# Patient Record
Sex: MALE | Race: BLACK OR AFRICAN AMERICAN | Employment: OTHER | URBAN - METROPOLITAN AREA
[De-identification: names, ages, dates, MRNs, and addresses within clinical notes are randomized per-mention and may not be internally consistent; named-entity substitution may affect disease eponyms.]

---

## 2017-03-07 ENCOUNTER — TELEPHONE (OUTPATIENT)
Dept: FAMILY MEDICINE CLINIC | Age: 67
End: 2017-03-07

## 2017-03-09 ENCOUNTER — OFFICE VISIT (OUTPATIENT)
Dept: FAMILY MEDICINE CLINIC | Age: 67
End: 2017-03-09

## 2017-03-09 VITALS
HEART RATE: 69 BPM | RESPIRATION RATE: 16 BRPM | TEMPERATURE: 97.8 F | WEIGHT: 186.6 LBS | DIASTOLIC BLOOD PRESSURE: 79 MMHG | BODY MASS INDEX: 27.64 KG/M2 | HEIGHT: 69 IN | SYSTOLIC BLOOD PRESSURE: 159 MMHG

## 2017-03-09 DIAGNOSIS — M79.604 PAIN IN BOTH LOWER EXTREMITIES: ICD-10-CM

## 2017-03-09 DIAGNOSIS — M79.605 PAIN IN BOTH LOWER EXTREMITIES: ICD-10-CM

## 2017-03-09 DIAGNOSIS — G57.11 MERALGIA PARESTHETICA OF RIGHT SIDE: Primary | ICD-10-CM

## 2017-03-09 DIAGNOSIS — D72.821 MONOCYTOSIS: ICD-10-CM

## 2017-03-09 DIAGNOSIS — M54.40 ACUTE RIGHT-SIDED LOW BACK PAIN WITH SCIATICA, SCIATICA LATERALITY UNSPECIFIED: ICD-10-CM

## 2017-03-09 PROCEDURE — 4040F PNEUMOC VAC/ADMIN/RCVD: CPT | Performed by: FAMILY MEDICINE

## 2017-03-09 PROCEDURE — G8420 CALC BMI NORM PARAMETERS: HCPCS | Performed by: FAMILY MEDICINE

## 2017-03-09 PROCEDURE — 3017F COLORECTAL CA SCREEN DOC REV: CPT | Performed by: FAMILY MEDICINE

## 2017-03-09 PROCEDURE — G8484 FLU IMMUNIZE NO ADMIN: HCPCS | Performed by: FAMILY MEDICINE

## 2017-03-09 PROCEDURE — 96372 THER/PROPH/DIAG INJ SC/IM: CPT | Performed by: FAMILY MEDICINE

## 2017-03-09 PROCEDURE — G8427 DOCREV CUR MEDS BY ELIG CLIN: HCPCS | Performed by: FAMILY MEDICINE

## 2017-03-09 PROCEDURE — 99214 OFFICE O/P EST MOD 30 MIN: CPT | Performed by: FAMILY MEDICINE

## 2017-03-09 PROCEDURE — 1123F ACP DISCUSS/DSCN MKR DOCD: CPT | Performed by: FAMILY MEDICINE

## 2017-03-09 PROCEDURE — 1036F TOBACCO NON-USER: CPT | Performed by: FAMILY MEDICINE

## 2017-03-09 RX ORDER — MELOXICAM 15 MG/1
15 TABLET ORAL DAILY
Qty: 30 TABLET | Refills: 3 | Status: SHIPPED | OUTPATIENT
Start: 2017-03-09 | End: 2018-08-11 | Stop reason: SDUPTHER

## 2017-03-09 RX ORDER — CYCLOBENZAPRINE HCL 10 MG
10 TABLET ORAL 3 TIMES DAILY PRN
COMMUNITY
End: 2017-03-09

## 2017-03-09 RX ORDER — BETAMETHASONE SODIUM PHOSPHATE AND BETAMETHASONE ACETATE 3; 3 MG/ML; MG/ML
6 INJECTION, SUSPENSION INTRA-ARTICULAR; INTRALESIONAL; INTRAMUSCULAR; SOFT TISSUE ONCE
Status: COMPLETED | OUTPATIENT
Start: 2017-03-09 | End: 2017-03-09

## 2017-03-09 RX ORDER — METHYLPREDNISOLONE ACETATE 40 MG/ML
40 INJECTION, SUSPENSION INTRA-ARTICULAR; INTRALESIONAL; INTRAMUSCULAR; SOFT TISSUE ONCE
Status: COMPLETED | OUTPATIENT
Start: 2017-03-09 | End: 2017-03-09

## 2017-03-09 RX ORDER — NAPROXEN 500 MG/1
500 TABLET ORAL 2 TIMES DAILY WITH MEALS
COMMUNITY
End: 2017-03-09

## 2017-03-09 RX ORDER — METAXALONE 800 MG/1
800 TABLET ORAL 3 TIMES DAILY
Qty: 30 TABLET | Refills: 1 | Status: SHIPPED | OUTPATIENT
Start: 2017-03-09 | End: 2017-03-19

## 2017-03-09 RX ADMIN — METHYLPREDNISOLONE ACETATE 40 MG: 40 INJECTION, SUSPENSION INTRA-ARTICULAR; INTRALESIONAL; INTRAMUSCULAR; SOFT TISSUE at 14:01

## 2017-03-09 RX ADMIN — BETAMETHASONE SODIUM PHOSPHATE AND BETAMETHASONE ACETATE 6 MG: 3; 3 INJECTION, SUSPENSION INTRA-ARTICULAR; INTRALESIONAL; INTRAMUSCULAR; SOFT TISSUE at 14:00

## 2017-03-09 ASSESSMENT — ENCOUNTER SYMPTOMS: BACK PAIN: 1

## 2017-05-16 DIAGNOSIS — J01.00 SUBACUTE MAXILLARY SINUSITIS: ICD-10-CM

## 2017-05-16 RX ORDER — PENICILLIN V POTASSIUM 500 MG/1
TABLET ORAL
Qty: 100 TABLET | Refills: 1 | Status: SHIPPED | OUTPATIENT
Start: 2017-05-16 | End: 2018-05-23 | Stop reason: SDUPTHER

## 2017-06-06 ENCOUNTER — TELEPHONE (OUTPATIENT)
Dept: FAMILY MEDICINE CLINIC | Age: 67
End: 2017-06-06

## 2017-06-15 ENCOUNTER — TELEPHONE (OUTPATIENT)
Dept: FAMILY MEDICINE CLINIC | Age: 67
End: 2017-06-15

## 2017-06-16 ENCOUNTER — TELEPHONE (OUTPATIENT)
Dept: FAMILY MEDICINE CLINIC | Age: 67
End: 2017-06-16

## 2017-07-09 DIAGNOSIS — G57.11 MERALGIA PARESTHETICA OF RIGHT SIDE: ICD-10-CM

## 2017-07-10 RX ORDER — HYDROCHLOROTHIAZIDE 25 MG/1
TABLET ORAL
Qty: 30 TABLET | Refills: 6 | Status: SHIPPED | OUTPATIENT
Start: 2017-07-10 | End: 2018-10-21 | Stop reason: SDUPTHER

## 2017-08-31 ENCOUNTER — TELEPHONE (OUTPATIENT)
Dept: FAMILY MEDICINE CLINIC | Age: 67
End: 2017-08-31

## 2017-09-29 ENCOUNTER — OFFICE VISIT (OUTPATIENT)
Dept: FAMILY MEDICINE CLINIC | Age: 67
End: 2017-09-29
Payer: MEDICARE

## 2017-09-29 ENCOUNTER — TELEPHONE (OUTPATIENT)
Dept: FAMILY MEDICINE CLINIC | Age: 67
End: 2017-09-29

## 2017-09-29 VITALS
WEIGHT: 178.6 LBS | RESPIRATION RATE: 12 BRPM | TEMPERATURE: 98 F | HEART RATE: 75 BPM | DIASTOLIC BLOOD PRESSURE: 79 MMHG | HEIGHT: 69 IN | SYSTOLIC BLOOD PRESSURE: 159 MMHG | OXYGEN SATURATION: 97 % | BODY MASS INDEX: 26.45 KG/M2

## 2017-09-29 DIAGNOSIS — E78.1 PURE HYPERGLYCERIDEMIA: ICD-10-CM

## 2017-09-29 DIAGNOSIS — R20.9 DISTURBANCE OF SKIN SENSATION: ICD-10-CM

## 2017-09-29 DIAGNOSIS — K90.89 OTHER SPECIFIED INTESTINAL MALABSORPTION: ICD-10-CM

## 2017-09-29 DIAGNOSIS — E78.5 LIPIDS BLOOD INCREASED: ICD-10-CM

## 2017-09-29 DIAGNOSIS — E86.0 DEHYDRATION: ICD-10-CM

## 2017-09-29 DIAGNOSIS — R21 RASH AND OTHER NONSPECIFIC SKIN ERUPTION: ICD-10-CM

## 2017-09-29 DIAGNOSIS — R05.9 COUGH: ICD-10-CM

## 2017-09-29 DIAGNOSIS — N52.01 ERECTILE DYSFUNCTION DUE TO ARTERIAL INSUFFICIENCY: Primary | ICD-10-CM

## 2017-09-29 DIAGNOSIS — M62.81 MUSCLE WEAKNESS (GENERALIZED): ICD-10-CM

## 2017-09-29 DIAGNOSIS — G57.11 MERALGIA PARESTHETICA OF RIGHT SIDE: ICD-10-CM

## 2017-09-29 DIAGNOSIS — R55 SYNCOPE AND COLLAPSE: ICD-10-CM

## 2017-09-29 DIAGNOSIS — G44.031 INTRACTABLE PAROXYSMAL HEMICRANIA, UNSPECIFIED CHRONICITY PATTERN: ICD-10-CM

## 2017-09-29 DIAGNOSIS — R04.2 HEMOPTYSIS: ICD-10-CM

## 2017-09-29 DIAGNOSIS — R68.2 DRY MOUTH: ICD-10-CM

## 2017-09-29 DIAGNOSIS — L70.8 OTHER ACNE: ICD-10-CM

## 2017-09-29 DIAGNOSIS — D72.821 MONOCYTOSIS: ICD-10-CM

## 2017-09-29 DIAGNOSIS — R94.5 NONSPECIFIC ABNORMAL RESULTS OF LIVER FUNCTION STUDY: ICD-10-CM

## 2017-09-29 DIAGNOSIS — R79.83 HOMOCYSTEINEMIA: ICD-10-CM

## 2017-09-29 DIAGNOSIS — K64.8 INTERNAL HEMORRHOIDS WITH OTHER COMPLICATION: ICD-10-CM

## 2017-09-29 DIAGNOSIS — R76.11 NONSPECIFIC REACTION TO TUBERCULIN SKIN TEST WITHOUT ACTIVE TUBERCULOSIS: ICD-10-CM

## 2017-09-29 DIAGNOSIS — R73.09 LOW GLUCOSE LEVEL: ICD-10-CM

## 2017-09-29 DIAGNOSIS — I10 ESSENTIAL HYPERTENSION: ICD-10-CM

## 2017-09-29 DIAGNOSIS — M54.2 NECK PAIN ON LEFT SIDE: ICD-10-CM

## 2017-09-29 DIAGNOSIS — R07.89 OTHER CHEST PAIN: ICD-10-CM

## 2017-09-29 DIAGNOSIS — R10.11 ABDOMINAL PAIN, RIGHT UPPER QUADRANT: ICD-10-CM

## 2017-09-29 LAB
ALBUMIN SERPL-MCNC: 4.1 G/DL (ref 3.5–5.1)
ALP BLD-CCNC: 65 U/L (ref 38–126)
ALT SERPL-CCNC: 18 U/L (ref 11–66)
ANION GAP SERPL CALCULATED.3IONS-SCNC: 15 MEQ/L (ref 8–16)
AST SERPL-CCNC: 19 U/L (ref 5–40)
AVERAGE GLUCOSE: 105 MG/DL (ref 70–126)
BASOPHILS # BLD: 2.3 %
BASOPHILS ABSOLUTE: 0.2 THOU/MM3 (ref 0–0.1)
BILIRUB SERPL-MCNC: 0.5 MG/DL (ref 0.3–1.2)
BILIRUBIN DIRECT: < 0.2 MG/DL (ref 0–0.3)
BUN BLDV-MCNC: 16 MG/DL (ref 7–22)
CALCIUM SERPL-MCNC: 9.2 MG/DL (ref 8.5–10.5)
CHLORIDE BLD-SCNC: 99 MEQ/L (ref 98–111)
CO2: 25 MEQ/L (ref 23–33)
CREAT SERPL-MCNC: 0.5 MG/DL (ref 0.4–1.2)
EOSINOPHIL # BLD: 3.2 %
EOSINOPHILS ABSOLUTE: 0.2 THOU/MM3 (ref 0–0.4)
GFR SERPL CREATININE-BSD FRML MDRD: > 90 ML/MIN/1.73M2
GLUCOSE BLD-MCNC: 87 MG/DL (ref 70–108)
HBA1C MFR BLD: 5.5 % (ref 4.4–6.4)
HCT VFR BLD CALC: 43.7 % (ref 42–52)
HEMOGLOBIN: 15.3 GM/DL (ref 14–18)
LYMPHOCYTES # BLD: 19.2 %
LYMPHOCYTES ABSOLUTE: 1.4 THOU/MM3 (ref 1–4.8)
MAGNESIUM: 2.3 MG/DL (ref 1.6–2.4)
MCH RBC QN AUTO: 30.6 PG (ref 27–31)
MCHC RBC AUTO-ENTMCNC: 35.1 GM/DL (ref 33–37)
MCV RBC AUTO: 87.2 FL (ref 80–94)
MONOCYTES # BLD: 10.7 %
MONOCYTES ABSOLUTE: 0.8 THOU/MM3 (ref 0.4–1.3)
NUCLEATED RED BLOOD CELLS: 0 /100 WBC
PDW BLD-RTO: 13.5 % (ref 11.5–14.5)
PLATELET # BLD: 112 THOU/MM3 (ref 130–400)
PMV BLD AUTO: 11.6 MCM (ref 7.4–10.4)
POTASSIUM SERPL-SCNC: 3.3 MEQ/L (ref 3.5–5.2)
PTH INTACT: 74.4 PG/ML (ref 15–65)
RBC # BLD: 5.02 MILL/MM3 (ref 4.7–6.1)
RBC # BLD: NORMAL 10*6/UL
RHEUMATOID FACTOR: < 10 IU/ML (ref 0–13)
SEDIMENTATION RATE, ERYTHROCYTE: 16 MM/HR (ref 0–10)
SEG NEUTROPHILS: 64.6 %
SEGMENTED NEUTROPHILS ABSOLUTE COUNT: 4.8 THOU/MM3 (ref 1.8–7.7)
SODIUM BLD-SCNC: 139 MEQ/L (ref 135–145)
T3 TOTAL: 142 NG/DL (ref 72–181)
THYROXINE (T4): 8.3 UG/DL (ref 4.5–12)
TOTAL PROTEIN: 7.2 G/DL (ref 6.1–8)
TSH SERPL DL<=0.05 MIU/L-ACNC: 1.71 UIU/ML (ref 0.4–4.2)
URIC ACID: 6.5 MG/DL (ref 3.7–7)
VITAMIN D 25-HYDROXY: 50 NG/ML (ref 30–100)
WBC # BLD: 7.4 THOU/MM3 (ref 4.8–10.8)

## 2017-09-29 PROCEDURE — 3017F COLORECTAL CA SCREEN DOC REV: CPT | Performed by: FAMILY MEDICINE

## 2017-09-29 PROCEDURE — G8419 CALC BMI OUT NRM PARAM NOF/U: HCPCS | Performed by: FAMILY MEDICINE

## 2017-09-29 PROCEDURE — G8427 DOCREV CUR MEDS BY ELIG CLIN: HCPCS | Performed by: FAMILY MEDICINE

## 2017-09-29 PROCEDURE — 4040F PNEUMOC VAC/ADMIN/RCVD: CPT | Performed by: FAMILY MEDICINE

## 2017-09-29 PROCEDURE — 99214 OFFICE O/P EST MOD 30 MIN: CPT | Performed by: FAMILY MEDICINE

## 2017-09-29 PROCEDURE — 1036F TOBACCO NON-USER: CPT | Performed by: FAMILY MEDICINE

## 2017-09-29 PROCEDURE — 1123F ACP DISCUSS/DSCN MKR DOCD: CPT | Performed by: FAMILY MEDICINE

## 2017-09-29 RX ORDER — SILDENAFIL 100 MG/1
TABLET, FILM COATED ORAL
Qty: 178 TABLET | Refills: 0 | Status: SHIPPED | OUTPATIENT
Start: 2017-09-29 | End: 2019-02-05 | Stop reason: SDUPTHER

## 2017-09-29 RX ORDER — ASPIRIN 325 MG
325 TABLET ORAL DAILY
COMMUNITY
End: 2018-08-29 | Stop reason: ALTCHOICE

## 2017-09-29 ASSESSMENT — PATIENT HEALTH QUESTIONNAIRE - PHQ9
2. FEELING DOWN, DEPRESSED OR HOPELESS: 0
SUM OF ALL RESPONSES TO PHQ9 QUESTIONS 1 & 2: 0
SUM OF ALL RESPONSES TO PHQ QUESTIONS 1-9: 0
1. LITTLE INTEREST OR PLEASURE IN DOING THINGS: 0

## 2017-09-30 LAB
C-REACTIVE PROTEIN, HIGH SENSITIVITY: 15 MG/L
DHEAS (DHEA SULFATE): 142 UG/DL (ref 42–290)
HOMOCYSTEINE, TOTAL: 7 UMOL/L
RPR: NONREACTIVE
TESTOSTERONE FREE: NORMAL
THYROID PEROXIDASE ANTIBODY: 2.2 IU/ML (ref 0–9)

## 2017-10-01 LAB
ANA SCREEN: NORMAL
GLIADIN PEPTIDE IGG, IGA: NORMAL
HIV-2 AB: NEGATIVE

## 2017-10-03 ENCOUNTER — TELEPHONE (OUTPATIENT)
Dept: FAMILY MEDICINE CLINIC | Age: 67
End: 2017-10-03

## 2017-10-03 LAB — DHEA UNCONJUGATED: 1.13 NG/ML (ref 0.63–4.7)

## 2017-12-21 DIAGNOSIS — M54.9 BACKACHE: ICD-10-CM

## 2017-12-21 DIAGNOSIS — M79.602 PAIN IN BOTH UPPER EXTREMITIES: ICD-10-CM

## 2017-12-21 DIAGNOSIS — K63.5 COLON POLYPS: ICD-10-CM

## 2017-12-21 DIAGNOSIS — M62.81 MUSCLE WEAKNESS (GENERALIZED): ICD-10-CM

## 2017-12-21 DIAGNOSIS — G57.11 MERALGIA PARESTHETICA OF RIGHT SIDE: ICD-10-CM

## 2017-12-21 DIAGNOSIS — G56.90: ICD-10-CM

## 2017-12-21 DIAGNOSIS — R79.83 HOMOCYSTEINEMIA: ICD-10-CM

## 2017-12-21 DIAGNOSIS — R20.9 DISTURBANCE OF SKIN SENSATION: ICD-10-CM

## 2017-12-21 DIAGNOSIS — I10 ESSENTIAL HYPERTENSION: ICD-10-CM

## 2017-12-21 DIAGNOSIS — R94.5 NONSPECIFIC ABNORMAL RESULTS OF LIVER FUNCTION STUDY: ICD-10-CM

## 2017-12-21 DIAGNOSIS — E78.89 LIPIDS ABNORMAL: ICD-10-CM

## 2017-12-21 DIAGNOSIS — M79.601 PAIN IN BOTH UPPER EXTREMITIES: ICD-10-CM

## 2017-12-21 DIAGNOSIS — R10.11 ABDOMINAL PAIN, RIGHT UPPER QUADRANT: ICD-10-CM

## 2017-12-21 RX ORDER — SIMVASTATIN 10 MG
TABLET ORAL
Qty: 90 TABLET | Refills: 2 | Status: SHIPPED | OUTPATIENT
Start: 2017-12-21 | End: 2019-03-01

## 2017-12-21 NOTE — TELEPHONE ENCOUNTER
Requested Prescriptions     Pending Prescriptions Disp Refills    simvastatin (ZOCOR) 10 MG tablet [Pharmacy Med Name: SIMVASTATIN 10 MG TABLET] 90 tablet 2     Sig: TAKE 1 TABLET BY MOUTH NIGHTLY.       LV-09/29/17  NV- none

## 2018-05-23 DIAGNOSIS — J01.00 SUBACUTE MAXILLARY SINUSITIS: ICD-10-CM

## 2018-05-23 RX ORDER — PENICILLIN V POTASSIUM 500 MG/1
TABLET ORAL
Qty: 100 TABLET | Refills: 1 | Status: SHIPPED | OUTPATIENT
Start: 2018-05-23 | End: 2019-03-01 | Stop reason: SDUPTHER

## 2018-08-11 DIAGNOSIS — G57.11 MERALGIA PARESTHETICA OF RIGHT SIDE: ICD-10-CM

## 2018-08-11 DIAGNOSIS — M79.605 PAIN IN BOTH LOWER EXTREMITIES: ICD-10-CM

## 2018-08-11 DIAGNOSIS — M79.604 PAIN IN BOTH LOWER EXTREMITIES: ICD-10-CM

## 2018-08-11 DIAGNOSIS — M54.40 ACUTE RIGHT-SIDED LOW BACK PAIN WITH SCIATICA, SCIATICA LATERALITY UNSPECIFIED: ICD-10-CM

## 2018-08-13 RX ORDER — MELOXICAM 15 MG/1
15 TABLET ORAL DAILY
Qty: 30 TABLET | Refills: 2 | Status: SHIPPED | OUTPATIENT
Start: 2018-08-13 | End: 2019-03-01

## 2018-08-13 NOTE — TELEPHONE ENCOUNTER
Last visit- Visit date not found  Next visit- 8/29/18    Requested Prescriptions     Pending Prescriptions Disp Refills    meloxicam (MOBIC) 15 MG tablet [Pharmacy Med Name: MELOXICAM 15 MG TABLET] 30 tablet 2     Sig: TAKE 1 TABLET BY MOUTH DAILY     Please approve or deny

## 2018-08-29 ENCOUNTER — OFFICE VISIT (OUTPATIENT)
Dept: FAMILY MEDICINE CLINIC | Age: 68
End: 2018-08-29
Payer: MEDICARE

## 2018-08-29 ENCOUNTER — HOSPITAL ENCOUNTER (OUTPATIENT)
Age: 68
Discharge: HOME | End: 2018-08-29
Payer: MEDICARE

## 2018-08-29 VITALS
WEIGHT: 183.6 LBS | HEIGHT: 69 IN | DIASTOLIC BLOOD PRESSURE: 86 MMHG | TEMPERATURE: 97.4 F | SYSTOLIC BLOOD PRESSURE: 152 MMHG | BODY MASS INDEX: 27.19 KG/M2 | RESPIRATION RATE: 10 BRPM

## 2018-08-29 DIAGNOSIS — E86.0 DEHYDRATION: ICD-10-CM

## 2018-08-29 DIAGNOSIS — R05.9 COUGH: ICD-10-CM

## 2018-08-29 DIAGNOSIS — I10 ESSENTIAL HYPERTENSION: ICD-10-CM

## 2018-08-29 DIAGNOSIS — D72.821 MONOCYTOSIS: ICD-10-CM

## 2018-08-29 DIAGNOSIS — E78.5 LIPIDS BLOOD INCREASED: ICD-10-CM

## 2018-08-29 DIAGNOSIS — R55 SYNCOPE AND COLLAPSE: ICD-10-CM

## 2018-08-29 DIAGNOSIS — R21 RASH AND OTHER NONSPECIFIC SKIN ERUPTION: ICD-10-CM

## 2018-08-29 DIAGNOSIS — R10.11 ABDOMINAL PAIN, RIGHT UPPER QUADRANT: ICD-10-CM

## 2018-08-29 DIAGNOSIS — E78.1 PURE HYPERGLYCERIDEMIA: ICD-10-CM

## 2018-08-29 DIAGNOSIS — R35.1 NOCTURIA: ICD-10-CM

## 2018-08-29 DIAGNOSIS — K90.89 OTHER INTESTINAL MALABSORPTION: ICD-10-CM

## 2018-08-29 DIAGNOSIS — R20.9 DISTURBANCE OF SKIN SENSATION: ICD-10-CM

## 2018-08-29 DIAGNOSIS — R76.11 NONSPECIFIC REACTION TO TUBERCULIN SKIN TEST WITHOUT ACTIVE TUBERCULOSIS: ICD-10-CM

## 2018-08-29 DIAGNOSIS — K90.89 OTHER INTESTINAL MALABSORPTION: Primary | ICD-10-CM

## 2018-08-29 DIAGNOSIS — E78.5 ELEVATED LIPIDS: ICD-10-CM

## 2018-08-29 DIAGNOSIS — R73.9 BLOOD GLUCOSE ELEVATED: ICD-10-CM

## 2018-08-29 DIAGNOSIS — M62.81 MUSCLE WEAKNESS (GENERALIZED): ICD-10-CM

## 2018-08-29 DIAGNOSIS — G57.11 MERALGIA PARESTHETICA OF RIGHT SIDE: ICD-10-CM

## 2018-08-29 DIAGNOSIS — R94.5 NONSPECIFIC ABNORMAL RESULTS OF LIVER FUNCTION STUDY: ICD-10-CM

## 2018-08-29 DIAGNOSIS — R07.89 OTHER CHEST PAIN: ICD-10-CM

## 2018-08-29 DIAGNOSIS — R79.83 HOMOCYSTEINEMIA: ICD-10-CM

## 2018-08-29 DIAGNOSIS — R04.2 HEMOPTYSIS: ICD-10-CM

## 2018-08-29 DIAGNOSIS — M54.2 NECK PAIN ON LEFT SIDE: ICD-10-CM

## 2018-08-29 DIAGNOSIS — R68.2 DRY MOUTH: ICD-10-CM

## 2018-08-29 DIAGNOSIS — L70.8 OTHER ACNE: ICD-10-CM

## 2018-08-29 LAB
ALBUMIN SERPL-MCNC: 4.2 G/DL (ref 3.5–5.1)
ALP BLD-CCNC: 80 U/L (ref 38–126)
ALT SERPL-CCNC: 16 U/L (ref 11–66)
AMYLASE: 55 U/L (ref 20–104)
ANION GAP SERPL CALCULATED.3IONS-SCNC: 13 MEQ/L (ref 8–16)
AST SERPL-CCNC: 20 U/L (ref 5–40)
AVERAGE GLUCOSE: 111 MG/DL (ref 70–126)
BASOPHILS # BLD: 0.2 %
BASOPHILS ABSOLUTE: 0 THOU/MM3 (ref 0–0.1)
BILIRUB SERPL-MCNC: 0.4 MG/DL (ref 0.3–1.2)
BILIRUBIN DIRECT: < 0.2 MG/DL (ref 0–0.3)
BILIRUBIN URINE: NEGATIVE
BLOOD, URINE: NEGATIVE
BUN BLDV-MCNC: 14 MG/DL (ref 7–22)
CALCIUM SERPL-MCNC: 9.7 MG/DL (ref 8.5–10.5)
CHARACTER, URINE: CLEAR
CHLORIDE BLD-SCNC: 98 MEQ/L (ref 98–111)
CHOLESTEROL, TOTAL: 213 MG/DL (ref 100–199)
CO2: 25 MEQ/L (ref 23–33)
COLOR: YELLOW
CREAT SERPL-MCNC: 0.6 MG/DL (ref 0.4–1.2)
EOSINOPHIL # BLD: 3.3 %
EOSINOPHILS ABSOLUTE: 0.3 THOU/MM3 (ref 0–0.4)
ERYTHROCYTE [DISTWIDTH] IN BLOOD BY AUTOMATED COUNT: 13.2 % (ref 11.5–14.5)
ERYTHROCYTE [DISTWIDTH] IN BLOOD BY AUTOMATED COUNT: 40.7 FL (ref 35–45)
GFR SERPL CREATININE-BSD FRML MDRD: > 90 ML/MIN/1.73M2
GLUCOSE BLD-MCNC: 94 MG/DL (ref 70–108)
GLUCOSE URINE: NEGATIVE MG/DL
HBA1C MFR BLD: 5.7 % (ref 4.4–6.4)
HCT VFR BLD CALC: 42.7 % (ref 42–52)
HDLC SERPL-MCNC: 34 MG/DL
HEMOGLOBIN: 15.1 GM/DL (ref 14–18)
IMMATURE GRANS (ABS): 0.01 THOU/MM3 (ref 0–0.07)
IMMATURE GRANULOCYTES: 0.1 %
KETONES, URINE: NEGATIVE
LDL CHOLESTEROL CALCULATED: 151 MG/DL
LEUKOCYTE ESTERASE, URINE: NEGATIVE
LIPASE: 25 U/L (ref 5.6–51.3)
LYMPHOCYTES # BLD: 25.2 %
LYMPHOCYTES ABSOLUTE: 2.1 THOU/MM3 (ref 1–4.8)
MAGNESIUM: 2 MG/DL (ref 1.6–2.4)
MCH RBC QN AUTO: 30.2 PG (ref 26–33)
MCHC RBC AUTO-ENTMCNC: 35.4 GM/DL (ref 32.2–35.5)
MCV RBC AUTO: 85.4 FL (ref 80–94)
MONOCYTES # BLD: 11.2 %
MONOCYTES ABSOLUTE: 0.9 THOU/MM3 (ref 0.4–1.3)
NITRITE, URINE: NEGATIVE
NUCLEATED RED BLOOD CELLS: 0 /100 WBC
PH UA: 6.5
PLATELET # BLD: 145 THOU/MM3 (ref 130–400)
PMV BLD AUTO: 12.9 FL (ref 9.4–12.4)
POTASSIUM SERPL-SCNC: 3.5 MEQ/L (ref 3.5–5.2)
PROSTATE SPECIFIC ANTIGEN: 2.06 NG/ML (ref 0–1)
PROTEIN UA: NEGATIVE
PTH INTACT: 75.1 PG/ML (ref 15–65)
RBC # BLD: 5 MILL/MM3 (ref 4.7–6.1)
RHEUMATOID FACTOR: < 10 IU/ML (ref 0–13)
SEDIMENTATION RATE, ERYTHROCYTE: 22 MM/HR (ref 0–10)
SEG NEUTROPHILS: 60 %
SEGMENTED NEUTROPHILS ABSOLUTE COUNT: 5 THOU/MM3 (ref 1.8–7.7)
SODIUM BLD-SCNC: 136 MEQ/L (ref 135–145)
SPECIFIC GRAVITY, URINE: 1.02 (ref 1–1.03)
T3 TOTAL: 123 NG/DL (ref 72–181)
TOTAL PROTEIN: 7.6 G/DL (ref 6.1–8)
TRIGL SERPL-MCNC: 141 MG/DL (ref 0–199)
TSH SERPL DL<=0.05 MIU/L-ACNC: 1.49 UIU/ML (ref 0.4–4.2)
UROBILINOGEN, URINE: 1 EU/DL
VITAMIN D 25-HYDROXY: 37 NG/ML (ref 30–100)
WBC # BLD: 8.4 THOU/MM3 (ref 4.8–10.8)

## 2018-08-29 PROCEDURE — 86430 RHEUMATOID FACTOR TEST QUAL: CPT

## 2018-08-29 PROCEDURE — 1101F PT FALLS ASSESS-DOCD LE1/YR: CPT | Performed by: FAMILY MEDICINE

## 2018-08-29 PROCEDURE — 1123F ACP DISCUSS/DSCN MKR DOCD: CPT | Performed by: FAMILY MEDICINE

## 2018-08-29 PROCEDURE — 83735 ASSAY OF MAGNESIUM: CPT

## 2018-08-29 PROCEDURE — 1036F TOBACCO NON-USER: CPT | Performed by: FAMILY MEDICINE

## 2018-08-29 PROCEDURE — 82150 ASSAY OF AMYLASE: CPT

## 2018-08-29 PROCEDURE — 85025 COMPLETE CBC W/AUTO DIFF WBC: CPT

## 2018-08-29 PROCEDURE — 84480 ASSAY TRIIODOTHYRONINE (T3): CPT

## 2018-08-29 PROCEDURE — G8419 CALC BMI OUT NRM PARAM NOF/U: HCPCS | Performed by: FAMILY MEDICINE

## 2018-08-29 PROCEDURE — 80053 COMPREHEN METABOLIC PANEL: CPT

## 2018-08-29 PROCEDURE — 99214 OFFICE O/P EST MOD 30 MIN: CPT | Performed by: FAMILY MEDICINE

## 2018-08-29 PROCEDURE — 86376 MICROSOMAL ANTIBODY EACH: CPT

## 2018-08-29 PROCEDURE — 3017F COLORECTAL CA SCREEN DOC REV: CPT | Performed by: FAMILY MEDICINE

## 2018-08-29 PROCEDURE — 82306 VITAMIN D 25 HYDROXY: CPT

## 2018-08-29 PROCEDURE — 84436 ASSAY OF TOTAL THYROXINE: CPT

## 2018-08-29 PROCEDURE — 82626 DEHYDROEPIANDROSTERONE: CPT

## 2018-08-29 PROCEDURE — G8427 DOCREV CUR MEDS BY ELIG CLIN: HCPCS | Performed by: FAMILY MEDICINE

## 2018-08-29 PROCEDURE — 86038 ANTINUCLEAR ANTIBODIES: CPT

## 2018-08-29 PROCEDURE — 80061 LIPID PANEL: CPT

## 2018-08-29 PROCEDURE — 84403 ASSAY OF TOTAL TESTOSTERONE: CPT

## 2018-08-29 PROCEDURE — 83090 ASSAY OF HOMOCYSTEINE: CPT

## 2018-08-29 PROCEDURE — 83970 ASSAY OF PARATHORMONE: CPT

## 2018-08-29 PROCEDURE — 4040F PNEUMOC VAC/ADMIN/RCVD: CPT | Performed by: FAMILY MEDICINE

## 2018-08-29 PROCEDURE — 84270 ASSAY OF SEX HORMONE GLOBUL: CPT

## 2018-08-29 PROCEDURE — 83036 HEMOGLOBIN GLYCOSYLATED A1C: CPT

## 2018-08-29 PROCEDURE — 82627 DEHYDROEPIANDROSTERONE: CPT

## 2018-08-29 PROCEDURE — 83516 IMMUNOASSAY NONANTIBODY: CPT

## 2018-08-29 PROCEDURE — 86141 C-REACTIVE PROTEIN HS: CPT

## 2018-08-29 PROCEDURE — 82248 BILIRUBIN DIRECT: CPT

## 2018-08-29 PROCEDURE — 83690 ASSAY OF LIPASE: CPT

## 2018-08-29 PROCEDURE — 36415 COLL VENOUS BLD VENIPUNCTURE: CPT

## 2018-08-29 PROCEDURE — 84443 ASSAY THYROID STIM HORMONE: CPT

## 2018-08-29 PROCEDURE — 85651 RBC SED RATE NONAUTOMATED: CPT

## 2018-08-29 PROCEDURE — 81003 URINALYSIS AUTO W/O SCOPE: CPT

## 2018-08-29 PROCEDURE — 84153 ASSAY OF PSA TOTAL: CPT

## 2018-08-29 RX ORDER — DIAPER,BRIEF,INFANT-TODD,DISP
EACH MISCELLANEOUS 2 TIMES DAILY
COMMUNITY
End: 2019-03-01

## 2018-08-29 NOTE — PROGRESS NOTES
for rash. All other systems reviewed and are negative. Prior to Visit Medications    Medication Sig Taking? Authorizing Provider   hydrocortisone 1 % cream Apply topically 2 times daily Apply topically 2 times daily. Yes Historical Provider, MD   penicillin v potassium (VEETID) 500 MG tablet TAKE 1 TABLET BY MOUTH 3 TIMES DAILY Yes Haydee Pickering MD   sildenafil (VIAGRA) 100 MG tablet TAKE ONE TABLET BY MOUTH APPROXIMATELY ONE HOUR BEFORE SEXUAL ACTIVITY. DO NOT USE MORE THAN ONE DOSE DAILY Yes Haydee Pickering MD   hydrochlorothiazide (HYDRODIURIL) 25 MG tablet TAKE 1 TABLET BY MOUTH DAILY Yes Haydee Pickering MD   meloxicam (MOBIC) 15 MG tablet TAKE 1 TABLET BY MOUTH DAILY  Haydee Pickering MD   simvastatin (ZOCOR) 10 MG tablet TAKE 1 TABLET BY MOUTH NIGHTLY. Haydee Pickering MD   VITAMIN E PO Take 3 tablets by mouth daily  Historical Provider, MD   B Complex-Biotin-FA (B-100 TR) TBCR Take 1 tablet by mouth 3 times daily (before meals) [facility] B-100 Time released balanced B complex tablet  Patient taking differently: Take 1 tablet by mouth 3 times daily (before meals) [facility], [facility], [employer] B-100 Time released balanced B complex tablet  Haydee Pickering MD   Specialty Vitamins Products (MAGNESIUM, AMINO ACID CHELATE,) 133 MG tablet Take 1 tablet by mouth 2 times daily. Patient taking differently: Take 1 tablet by mouth 3 times daily. Haydee Pickering MD        No Known Allergies    Past Medical History:   Diagnosis Date    Anorgasmia     Chest pain     Hemoptysis     Hemorrhoids, internal, with bleeding     Hypertension     LBP (low back pain)     LFTs abnormal     Neuropathy     Paresthesia of left arm     Pure hyperglyceridemia        No past surgical history on file. Social History     Social History    Marital status:      Spouse name: N/A    Number of children: N/A    Years of education: N/A     Occupational History    Not on file.      Social History Main Topics    Smoking status: Never Smoker    Smokeless tobacco: Never Used    Alcohol use No    Drug use: No    Sexual activity: Not on file     Other Topics Concern    Not on file     Social History Narrative    No narrative on file        Family History   Problem Relation Age of Onset    High Blood Pressure Father     Stroke Father        Vitals:    08/29/18 1433   BP: (!) 152/86   Site: Left Arm   Position: Sitting   Cuff Size: Large Adult   Resp: 10   Temp: 97.4 °F (36.3 °C)   TempSrc: Oral   Weight: 183 lb 9.6 oz (83.3 kg)   Height: 5' 8.5\" (1.74 m)     Estimated body mass index is 27.51 kg/m² as calculated from the following:    Height as of this encounter: 5' 8.5\" (1.74 m). Weight as of this encounter: 183 lb 9.6 oz (83.3 kg). Physical Exam   Constitutional: He is oriented to person, place, and time. He appears well-developed and well-nourished. HENT:   Head: Normocephalic. Pulmonary/Chest: Effort normal.   Neurological: He is alert and oriented to person, place, and time. Psychiatric: He has a normal mood and affect. Thought content normal.   Nursing note and vitals reviewed. ASSESSMENT/PLAN:      No Follow-up on file. Laboratory Data:   Lab results were searched in Care Everywhere and/or those brought by the pateint were reviewed today with Joce and he has a copy of their most recent labs to take home with them as noted below;       Imaging Data:   Imaging Data:       Assessment & Plan:       Impression:  1. Other intestinal malabsorption    2. Homocysteinemia ([address]    3. Neck pain on left side    4. Syncope and collapse     5. Monocytosis    6. Dry mouth    7. Meralgia paresthetica of right side    8. Dehydration    9. Hemoptysis    10. Essential hypertension    11. Disturbance of skin sensation    12. Abdominal pain, right upper quadrant    13. Lipids blood increased    14. Other acne    15. Nonspecific reaction to tuberculin skin test without active tuberculosis    16.  Other chest pain or 500 mg tabs if < 2 bowel movements daily  - Multivitamin Seniors for most patients, One Daily or Item with iron  - Vit D 3  1,000IU ,   2,000 IU,  5,000 IU, can start low and buy larger on 2nd bottle     Some brands containing or derived from soy oil or corn oil are OK if not allergic to soy    Nutrients for Special Needs by Mail order for less expense;  - Elemental Iron 65 mg tabs if need more iron for low iron on labs    Usually turns bowel movements grey, green or black but not a concern  - Time released Niacin 250 mg for cold intolerance, low libido or impotence  - DHEA 10 mg, 25 mg, or 50 mg for improving DHEA levels on labs if having Fatigue    If stools too loose substitute for your Magnesium oxide using;   Magnesium citrate 200 mg tabs(NOT liquid, NOT caps) amazon. com  Magnesium gluconate 550 mg by Lissa Sanches at "[employer]  Magnesium chloride foot soaks or body sprays  www[employer]   Magnesium chloride flakes for soaks, or magnesium spray or cream    Food Drink Symptom Log;  I asked this patient to track these items and any other symptoms on their list on a weekly basis to document their progress or lack of same. This can be done on the symptom tracking sheet available to them at today's visit but looks like this:                                                      Rate on scale of 0-10 with zero = not noticeable  Symptom:                            Week 1               2                 3                 4               Etc            Hair loss    Foot cramps    Paresthesia    Aches    IBS (irritable bowel)    Constipation    Diarrhea  Nocturia    (up to bathroom at night)    Fatigue/Energy level    Stress      On the other side of the sheet they can track their food, drink, environment, activity, symptoms etc      Avoiding Latex-like proteins in my foods;     Avocados, Bananas, Celery, Figs & Kiwi proteins have latex-like proteins to inflame our immune systems, see the 51 latex-like foods list  How Can I Have A Latex Allergy? Eating foods with latex-like protein exposes us to latex allergies. Our body cannot tell the difference between these latex-like proteins and latex from rubber products since many people are allergic to fruit, vegetables and latex. Read labels on pre-packaged foods. This list to avoid is only a guide if you are known allergic to latex or have a latex rash on your chin, cheeks and lines on your neck and chest. The amount of latex is different in each food product or fruit variety. Foods to Avoid out of Season if not grown locally: Melon, Nectarine, Papaya, Cherry, Passion fruit, Plum, Chestnuts, and Tomato. Avocado, Banana, Celery, Figs, and Kiwi always contain Latex-like protein and are almost universally toxic    Whats in Season? Strawberries taste better in June than December because June is strawberry season so buy locally grown produce \"in season\" for the best flavor, cost and less Latex. Locally grown produce not only tastes great requires little of no ethylene exposure in food distribution so has less latex content. Out of season, use canned, frozen or dried since processed ripe and are latex lower!!!   Month     [place] Locally Grown Produce  January, February, March: use canned, frozen or dried fruits since lower in latex  April; asparagus, radishes  May; asparagus, broccoli, green onions, greens, peas, radishes, rhubarb  June; asparagus, beets, beans, broccoli, cabbage, cantaloupe, carrots, green onions, greens, lettuce, onions, parsley, peas, radishes, rhubarb, strawberries, watermelons  July; beans, beets, blueberries, broccoli, cabbage, cantaloupe, carrots, cauliflower, celery, cucumbers, eggplant, grapes, green onions, greens, lettuce, onions, parsley, peas, peaches, bell peppers, potatoes, radishes, summer raspberries, squash, sweet corn, tomatoes, turnips, watermelons  August; apples, beans, beets, blueberries, cabbage, cantaloupe, carrots, cauliflower,

## 2018-08-30 LAB — THYROXINE (T4): 8 UG/DL (ref 4.5–12)

## 2018-08-31 LAB
C-REACTIVE PROTEIN, HIGH SENSITIVITY: 14.4 MG/L
HOMOCYSTEINE, TOTAL: 8 UMOL/L

## 2018-09-01 LAB
ANA SCREEN: NORMAL
DHEAS (DHEA SULFATE): 152 UG/DL (ref 42–290)
GLIADIN PEPTIDE IGG, IGA: NORMAL
TESTOSTERONE FREE: NORMAL
THYROID PEROXIDASE ANTIBODY: 1.9 IU/ML (ref 0–9)

## 2018-09-02 LAB — DHEA UNCONJUGATED: 1.25 NG/ML (ref 0.63–4.7)

## 2018-10-21 DIAGNOSIS — G57.11 MERALGIA PARESTHETICA OF RIGHT SIDE: ICD-10-CM

## 2018-10-22 RX ORDER — HYDROCHLOROTHIAZIDE 25 MG/1
TABLET ORAL
Qty: 30 TABLET | Refills: 5 | Status: SHIPPED | OUTPATIENT
Start: 2018-10-22 | End: 2019-03-01

## 2019-02-05 DIAGNOSIS — N52.01 ERECTILE DYSFUNCTION DUE TO ARTERIAL INSUFFICIENCY: ICD-10-CM

## 2019-02-06 RX ORDER — SILDENAFIL 100 MG/1
TABLET, FILM COATED ORAL
Qty: 2 TABLET | Refills: 86 | Status: SHIPPED | OUTPATIENT
Start: 2019-02-06 | End: 2020-01-10

## 2019-03-01 ENCOUNTER — OFFICE VISIT (OUTPATIENT)
Dept: FAMILY MEDICINE CLINIC | Age: 69
End: 2019-03-01
Payer: MEDICARE

## 2019-03-01 ENCOUNTER — HOSPITAL ENCOUNTER (OUTPATIENT)
Age: 69
Discharge: HOME | End: 2019-03-01
Payer: MEDICARE

## 2019-03-01 VITALS
SYSTOLIC BLOOD PRESSURE: 148 MMHG | BODY MASS INDEX: 27.71 KG/M2 | WEIGHT: 182.8 LBS | HEART RATE: 69 BPM | DIASTOLIC BLOOD PRESSURE: 80 MMHG | HEIGHT: 68 IN | OXYGEN SATURATION: 98 % | RESPIRATION RATE: 10 BRPM | TEMPERATURE: 97.8 F

## 2019-03-01 DIAGNOSIS — R07.89 OTHER CHEST PAIN: ICD-10-CM

## 2019-03-01 DIAGNOSIS — G56.90 MONONEURITIS OF UPPER EXTREMITY, UNSPECIFIED LATERALITY: ICD-10-CM

## 2019-03-01 DIAGNOSIS — I10 ESSENTIAL HYPERTENSION: ICD-10-CM

## 2019-03-01 DIAGNOSIS — E78.1 PURE HYPERGLYCERIDEMIA: ICD-10-CM

## 2019-03-01 DIAGNOSIS — R68.2 DRY MOUTH: ICD-10-CM

## 2019-03-01 DIAGNOSIS — D72.821 MONOCYTOSIS: ICD-10-CM

## 2019-03-01 DIAGNOSIS — R21 RASH AND OTHER NONSPECIFIC SKIN ERUPTION: ICD-10-CM

## 2019-03-01 DIAGNOSIS — E78.5 ELEVATED LIPIDS: ICD-10-CM

## 2019-03-01 DIAGNOSIS — R55 SYNCOPE AND COLLAPSE: ICD-10-CM

## 2019-03-01 DIAGNOSIS — M62.81 MUSCLE WEAKNESS (GENERALIZED): ICD-10-CM

## 2019-03-01 DIAGNOSIS — M54.40 ACUTE RIGHT-SIDED LOW BACK PAIN WITH SCIATICA, SCIATICA LATERALITY UNSPECIFIED: ICD-10-CM

## 2019-03-01 DIAGNOSIS — G57.11 MERALGIA PARESTHETICA OF RIGHT SIDE: ICD-10-CM

## 2019-03-01 DIAGNOSIS — R94.5 NONSPECIFIC ABNORMAL RESULTS OF LIVER FUNCTION STUDY: ICD-10-CM

## 2019-03-01 DIAGNOSIS — R73.9 BLOOD GLUCOSE ELEVATED: ICD-10-CM

## 2019-03-01 DIAGNOSIS — M54.2 NECK PAIN ON LEFT SIDE: ICD-10-CM

## 2019-03-01 DIAGNOSIS — L70.8 OTHER ACNE: ICD-10-CM

## 2019-03-01 DIAGNOSIS — R04.2 HEMOPTYSIS: ICD-10-CM

## 2019-03-01 DIAGNOSIS — R10.11 ABDOMINAL PAIN, RIGHT UPPER QUADRANT: ICD-10-CM

## 2019-03-01 DIAGNOSIS — R35.1 NOCTURIA: ICD-10-CM

## 2019-03-01 DIAGNOSIS — K90.89 OTHER INTESTINAL MALABSORPTION: ICD-10-CM

## 2019-03-01 DIAGNOSIS — R20.9 DISTURBANCE OF SKIN SENSATION: ICD-10-CM

## 2019-03-01 DIAGNOSIS — J01.00 SUBACUTE MAXILLARY SINUSITIS: ICD-10-CM

## 2019-03-01 DIAGNOSIS — R79.83 HOMOCYSTEINEMIA: ICD-10-CM

## 2019-03-01 DIAGNOSIS — R76.11 NONSPECIFIC REACTION TO TUBERCULIN SKIN TEST WITHOUT ACTIVE TUBERCULOSIS: ICD-10-CM

## 2019-03-01 LAB
BILIRUBIN URINE: NEGATIVE
BLOOD, URINE: NEGATIVE
CHARACTER, URINE: CLEAR
COLOR: YELLOW
GLUCOSE URINE: NEGATIVE MG/DL
KETONES, URINE: NEGATIVE
LEUKOCYTE ESTERASE, URINE: NEGATIVE
NITRITE, URINE: NEGATIVE
PH UA: 6.5
PROTEIN UA: NEGATIVE
SPECIFIC GRAVITY, URINE: 1.02 (ref 1–1.03)
UROBILINOGEN, URINE: 0.2 EU/DL

## 2019-03-01 PROCEDURE — 99214 OFFICE O/P EST MOD 30 MIN: CPT | Performed by: FAMILY MEDICINE

## 2019-03-01 PROCEDURE — 1123F ACP DISCUSS/DSCN MKR DOCD: CPT | Performed by: FAMILY MEDICINE

## 2019-03-01 PROCEDURE — G8419 CALC BMI OUT NRM PARAM NOF/U: HCPCS | Performed by: FAMILY MEDICINE

## 2019-03-01 PROCEDURE — 81003 URINALYSIS AUTO W/O SCOPE: CPT

## 2019-03-01 PROCEDURE — G8427 DOCREV CUR MEDS BY ELIG CLIN: HCPCS | Performed by: FAMILY MEDICINE

## 2019-03-01 PROCEDURE — G8484 FLU IMMUNIZE NO ADMIN: HCPCS | Performed by: FAMILY MEDICINE

## 2019-03-01 PROCEDURE — 3017F COLORECTAL CA SCREEN DOC REV: CPT | Performed by: FAMILY MEDICINE

## 2019-03-01 PROCEDURE — 1036F TOBACCO NON-USER: CPT | Performed by: FAMILY MEDICINE

## 2019-03-01 PROCEDURE — 1101F PT FALLS ASSESS-DOCD LE1/YR: CPT | Performed by: FAMILY MEDICINE

## 2019-03-01 PROCEDURE — 4040F PNEUMOC VAC/ADMIN/RCVD: CPT | Performed by: FAMILY MEDICINE

## 2019-03-01 RX ORDER — SULFAMETHOXAZOLE AND TRIMETHOPRIM 800; 160 MG/1; MG/1
1 TABLET ORAL 2 TIMES DAILY
Qty: 20 TABLET | Refills: 1 | Status: SHIPPED | OUTPATIENT
Start: 2019-03-01 | End: 2019-03-21

## 2019-03-01 RX ORDER — PENICILLIN V POTASSIUM 500 MG/1
TABLET ORAL
Qty: 100 TABLET | Refills: 1 | Status: SHIPPED | OUTPATIENT
Start: 2019-03-01 | End: 2020-01-10 | Stop reason: SDUPTHER

## 2019-03-01 RX ORDER — MONTELUKAST SODIUM 10 MG/1
10 TABLET ORAL DAILY
Qty: 30 TABLET | Refills: 3 | Status: SHIPPED | OUTPATIENT
Start: 2019-03-01 | End: 2020-01-10

## 2019-03-01 ASSESSMENT — ENCOUNTER SYMPTOMS
SINUS PAIN: 1
SORE THROAT: 1
COUGH: 1
TROUBLE SWALLOWING: 1
VOICE CHANGE: 1

## 2019-03-01 ASSESSMENT — PATIENT HEALTH QUESTIONNAIRE - PHQ9
SUM OF ALL RESPONSES TO PHQ9 QUESTIONS 1 & 2: 0
2. FEELING DOWN, DEPRESSED OR HOPELESS: 0
SUM OF ALL RESPONSES TO PHQ QUESTIONS 1-9: 0
SUM OF ALL RESPONSES TO PHQ QUESTIONS 1-9: 0
1. LITTLE INTEREST OR PLEASURE IN DOING THINGS: 0

## 2019-05-21 ENCOUNTER — TELEPHONE (OUTPATIENT)
Dept: FAMILY MEDICINE CLINIC | Age: 69
End: 2019-05-21

## 2019-05-21 DIAGNOSIS — M79.605 PAIN IN BOTH LOWER EXTREMITIES: Primary | ICD-10-CM

## 2019-05-21 DIAGNOSIS — M62.81 MUSCLE WEAKNESS (GENERALIZED): ICD-10-CM

## 2019-05-21 DIAGNOSIS — R55 SYNCOPE AND COLLAPSE: ICD-10-CM

## 2019-05-21 DIAGNOSIS — M79.604 PAIN IN BOTH LOWER EXTREMITIES: Primary | ICD-10-CM

## 2019-05-21 DIAGNOSIS — M79.89 LEG SWELLING: ICD-10-CM

## 2019-05-22 NOTE — TELEPHONE ENCOUNTER
Pt called and stated that the doppler can't be done now because the order is not written for stat. Can we change the order to stat so that he has the results by Friday?

## 2019-05-22 NOTE — TELEPHONE ENCOUNTER
Advised pt, he wants scheduled at [facility] after 4 pm.  Told him we would call him back  If & when scheduled.

## 2019-05-22 NOTE — TELEPHONE ENCOUNTER
Alexander Pittman changed the doppler to a STAT. Faxed orders to [phone] and called pt to advise.

## 2019-05-23 ENCOUNTER — TELEPHONE (OUTPATIENT)
Dept: FAMILY MEDICINE CLINIC | Age: 69
End: 2019-05-23

## 2019-05-23 NOTE — TELEPHONE ENCOUNTER
Cancelled appointment with Dr Jt Sun for Friday and told pt to call when he can get a day off next week to come in.

## 2019-05-28 NOTE — TELEPHONE ENCOUNTER
Joce calls asking to speak with Kelsie Nelson or Adali Hoover. He had his US done and is asking about labs? ? He would like someone to call him today because he has an appointment on Friday 5/31 with Dr Jaime Douglas. Please advise.

## 2019-05-29 NOTE — TELEPHONE ENCOUNTER
Advised pt that there is a new lab that Dr Mick Smith wants done and faxed it to [facility] as requested. Pt aware and stated that his other labs were completed but he can't remember where [place]

## 2019-05-30 NOTE — TELEPHONE ENCOUNTER
SANDRA CALLED BACK, THEY WILL NOT TAKE TH ORDER, NEED IT TO HAVE TODAYS DATE INSTEAD OF EXPECTED 2 MO FROM NOW. CHANGED THE DATE, MARKED THE ICD [facility], AND FAXED [phone]. \  ALSO CALLED PT AFTER FAXING.

## 2019-05-31 ENCOUNTER — OFFICE VISIT (OUTPATIENT)
Dept: FAMILY MEDICINE CLINIC | Age: 69
End: 2019-05-31
Payer: MEDICARE

## 2019-05-31 VITALS
HEART RATE: 63 BPM | DIASTOLIC BLOOD PRESSURE: 90 MMHG | WEIGHT: 184 LBS | BODY MASS INDEX: 27.89 KG/M2 | HEIGHT: 68 IN | OXYGEN SATURATION: 98 % | TEMPERATURE: 97.9 F | SYSTOLIC BLOOD PRESSURE: 155 MMHG | RESPIRATION RATE: 10 BRPM

## 2019-05-31 DIAGNOSIS — M62.81 MUSCLE WEAKNESS (GENERALIZED): ICD-10-CM

## 2019-05-31 DIAGNOSIS — E78.1 PURE HYPERGLYCERIDEMIA: Primary | ICD-10-CM

## 2019-05-31 DIAGNOSIS — M79.89 LEG SWELLING: ICD-10-CM

## 2019-05-31 DIAGNOSIS — R79.83 HOMOCYSTEINEMIA: ICD-10-CM

## 2019-05-31 DIAGNOSIS — E78.5 ELEVATED LIPIDS: ICD-10-CM

## 2019-05-31 DIAGNOSIS — I10 ESSENTIAL HYPERTENSION: ICD-10-CM

## 2019-05-31 DIAGNOSIS — D72.821 MONOCYTOSIS: ICD-10-CM

## 2019-05-31 PROCEDURE — 4040F PNEUMOC VAC/ADMIN/RCVD: CPT | Performed by: FAMILY MEDICINE

## 2019-05-31 PROCEDURE — G8419 CALC BMI OUT NRM PARAM NOF/U: HCPCS | Performed by: FAMILY MEDICINE

## 2019-05-31 PROCEDURE — 99214 OFFICE O/P EST MOD 30 MIN: CPT | Performed by: FAMILY MEDICINE

## 2019-05-31 PROCEDURE — 1123F ACP DISCUSS/DSCN MKR DOCD: CPT | Performed by: FAMILY MEDICINE

## 2019-05-31 PROCEDURE — 3017F COLORECTAL CA SCREEN DOC REV: CPT | Performed by: FAMILY MEDICINE

## 2019-05-31 PROCEDURE — G8427 DOCREV CUR MEDS BY ELIG CLIN: HCPCS | Performed by: FAMILY MEDICINE

## 2019-05-31 PROCEDURE — 1036F TOBACCO NON-USER: CPT | Performed by: FAMILY MEDICINE

## 2019-05-31 RX ORDER — POTASSIUM CHLORIDE 750 MG/1
10 TABLET, EXTENDED RELEASE ORAL 2 TIMES DAILY
Qty: 180 TABLET | Refills: 1 | Status: SHIPPED | OUTPATIENT
Start: 2019-05-31 | End: 2020-01-10

## 2019-05-31 RX ORDER — ICOSAPENT ETHYL 1000 MG/1
2 CAPSULE ORAL
Qty: 240 CAPSULE | Refills: 3 | Status: SHIPPED | OUTPATIENT
Start: 2019-05-31 | End: 2020-01-10

## 2019-05-31 RX ORDER — HYDROCHLOROTHIAZIDE 25 MG/1
1 TABLET ORAL DAILY
Refills: 5 | COMMUNITY
Start: 2019-05-08 | End: 2019-05-31

## 2019-05-31 RX ORDER — DOXYCYCLINE HYCLATE 100 MG/1
100 CAPSULE ORAL 2 TIMES DAILY
Qty: 40 CAPSULE | Refills: 0 | Status: SHIPPED | OUTPATIENT
Start: 2019-05-31 | End: 2019-06-05 | Stop reason: SINTOL

## 2019-05-31 RX ORDER — B-COMPLEX WITH VITAMIN C
1 TABLET ORAL
Qty: 200 TABLET | Refills: 5 | Status: SHIPPED | OUTPATIENT
Start: 2019-05-31 | End: 2020-01-10

## 2019-05-31 RX ORDER — AMLODIPINE BESYLATE 10 MG/1
1 TABLET ORAL DAILY
Refills: 0 | COMMUNITY
Start: 2019-05-07 | End: 2020-01-10 | Stop reason: ALTCHOICE

## 2019-05-31 RX ORDER — HYDROCHLOROTHIAZIDE 25 MG/1
25 TABLET ORAL DAILY
Qty: 30 TABLET | Refills: 5 | Status: SHIPPED | OUTPATIENT
Start: 2019-05-31 | End: 2019-12-18

## 2019-05-31 NOTE — PROGRESS NOTES
[id number] [address]  Dept: [phone]  Dept Fax: [phone]  Loc: [phone]      Lynn Hastings is a 70 y.o. Black male. Joce  presents to the [facility] today for   Chief Complaint   Patient presents with    Discuss Labs     DOPPLER AND D-DIMER    Leg Swelling     MORE RIGHT THAN LEFT 10 days ago    Other     TROUBLE URINATING, SOMETIMES HAS HEADACHES- BAD    Constipation     GASSY    Discuss Medications     AUGMENTIN, AMLODIPINE, SULFAMETHOZOLE   ,  and;   1. Pure hyperglyceridemia    2. Homocysteinemia ([facility] [address]    3. Muscle weakness (generalized)    4. Leg swelling    5. Elevated lipids    6. Essential hypertension    7. Monocytosis      I have reviewed Joce Gunter medical, surgical and other pertinent history in detail, and have updated medication and allergy information in the computerized patientrecord. Clinical Care Team:     -Referring Provider for today's consult: Self Referred  -Primary Care Provider: No primary care provider on file. Medical/Surgical History:   He  has a past medical history of Anorgasmia, Chest pain, Hemoptysis, Hemorrhoids, internal, with bleeding, Hypertension, LBP (low back pain), LFTs abnormal, Neuropathy, Paresthesia of left arm, and Pure hyperglyceridemia. His  has no past surgical history on file. Family/Social History:     His family history includes High Blood Pressure in his father; Stroke in his father. He  reports that he has never smoked. He has never used smokeless tobacco. He reports that he does not drink alcohol or use drugs.     Medications/Allergies/Immunizations:     His current medication(s) include   Current Outpatient Medications:     Multiple Minerals-Vitamins (DINAH-MAG-ZINC-D) TABS, Take 1 tablet by mouth 4 times daily (before meals and nightly), Disp: 200 tablet, Rfl: 5    doxycycline hyclate (VIBRAMYCIN) 100 MG capsule, Take 1 capsule by mouth 2 times daily for 20 days, Disp: 40 capsule, Rfl: 0    potassium chloride (KLOR-CON M) 10 MEQ extended release tablet, Take 1 tablet by mouth 2 times daily For low potassium, Disp: 180 tablet, Rfl: 1    hydrochlorothiazide (HYDRODIURIL) 25 MG tablet, Take 1 tablet by mouth daily At breakfast and dinner if leg or foot swollen, Disp: 30 tablet, Rfl: 5    Icosapent Ethyl (VASCEPA) 1 g CAPS capsule, Take 2 capsules by mouth 4 times daily (before meals and nightly), Disp: 240 capsule, Rfl: 3    penicillin v potassium (VEETID) 500 MG tablet, TAKE 1 TABLET BY MOUTH 3 TIMES DAILY, Disp: 100 tablet, Rfl: 1    montelukast (SINGULAIR) 10 MG tablet, Take 1 tablet by mouth daily (Patient taking differently: Take 10 mg by mouth daily as needed ), Disp: 30 tablet, Rfl: 3    sildenafil (VIAGRA) 100 MG tablet, TAKE ONE TABLET BY MOUTH APPROXIMATELY ONE HOUR BEFORE SEXUAL ACTIVITY. DO NOT USE MORE THAN ONE DOSE DAILY, Disp: 2 tablet, Rfl: 86    B Complex-Biotin-FA (B-100 TR) TBCR, Take 1 tablet by mouth 3 times daily (before meals) [facility] B-100 Time released balanced B complex tablet (Patient taking differently: Take 1 tablet by mouth 3 times daily (before meals) [employer], [employer], [employer] B-100 Time released balanced B complex tablet), Disp: 100 tablet, Rfl: 5    Specialty Vitamins Products (MAGNESIUM, AMINO ACID CHELATE,) 133 MG tablet, Take 1 tablet by mouth 2 times daily. (Patient taking differently: Take 1 tablet by mouth 3 times daily.), Disp: 60 tablet, Rfl: 1    amLODIPine (NORVASC) 10 MG tablet, Take 1 tablet by mouth daily, Disp: , Rfl: 0  Allergies: Patient has no known allergies. ,  Immunizations:   Immunization History   Administered Date(s) Administered    Influenza Vaccine, unspecified formulation 11/21/2013, 11/29/2017    Influenza Virus Vaccine 11/12/2014, 11/24/2015    Influenza Whole 09/28/2010    PPD Test 05/16/2011        History of PresentIllness: Zacharyitomyron's had concerns including Discuss Labs (DOPPLER AND D-DIMER); Leg Swelling (MORE RIGHT THAN LEFT 10 days ago); Other (TROUBLE URINATING, SOMETIMES HAS HEADACHES- BAD); Constipation (GASSY); and Discuss Medications (AUGMENTIN, AMLODIPINE, SULFAMETHOZOLE). Marciano Gaitan  presents to the [employer] today for;   Chief Complaint   Patient presents with    Discuss Labs     DOPPLER AND D-DIMER    Leg Swelling     MORE RIGHT THAN LEFT 10 days ago    Other     TROUBLE URINATING, SOMETIMES HAS HEADACHES- BAD    Constipation     GASSY    Discuss Medications     AUGMENTIN, AMLODIPINE, SULFAMETHOZOLE   , ,  abnormal labs follow up and these conditions as he  Is looking today for:     1. Pure hyperglyceridemia    2. Homocysteinemia ([address]    3. Muscle weakness (generalized)    4. Leg swelling    5. Elevated lipids    6. Essential hypertension    7. Monocytosis      HPI    Subjective:     Review of Systems   Cardiovascular: Positive for leg swelling. All other systems reviewed and are negative. Objective:     BP (!) 155/90 (Site: Left Upper Arm, Position: Sitting, Cuff Size: Large Adult)   Pulse 63   Temp 97.9 °F (36.6 °C) (Oral)   Resp 10   Ht 5' 8.27\" (1.734 m)   Wt 184 lb (83.5 kg)   SpO2 98%   BMI 27.76 kg/m²   Physical Exam   Constitutional: He is oriented to person, place, and time. He appears well-developed and well-nourished. HENT:   Head: Normocephalic. Pulmonary/Chest: Effort normal.   Musculoskeletal: He exhibits edema. Neurological: He is alert and oriented to person, place, and time. Psychiatric: He has a normal mood and affect. Thought content normal.   Nursing note and vitals reviewed.          Laboratory Data:   Lab results were searched in Care Everywhere and/or those brought by the pateint were reviewed today with Joce and he has a copy of their most recent labs to take home with them as notedbelow;       Imaging Data:   Imaging Data:       Assessment & Plan: Impression:  1. Pure hyperglyceridemia    2. Homocysteinemia ([address]    3. Muscle weakness (generalized)    4. Leg swelling    5. Elevated lipids    6. Essential hypertension    7. Monocytosis      Assessment and Plan:  After reviewing the patients chief complaints, reviewing their labfindings in great detail (with the patient and those accompanying them) which correlate to their chief complaints, symptoms, and or medical conditions; suggestions were made relating to changes in diet and or supplementswhich may improve the complaints and which will be reflected in their future lab findings; Chief Complaint   Patient presents with    Discuss Labs     DOPPLER AND D-DIMER    Leg Swelling     MORE RIGHT THAN LEFT 10 days ago    Other     TROUBLE URINATING, SOMETIMES HAS HEADACHES- BAD    Constipation     GASSY    Discuss Medications     AUGMENTIN, AMLODIPINE, SULFAMETHOZOLE   ;    Plans for the next visits:  - Abnormal and non-optimal Labs were ordered today to be repeated in the next 120-365 days to assess changes from adjustments in nutrition and or nutrients. - Patient instructed when having ablood draw to ask the  to divide their lab draws into multiple draws over several days if not feeling good at the time of the lab draw or if either prefers to do several smaller blood draws over several days  -Patient instructed to check with insurer before each lab draw and to to to the lab which the insurer directs them for the most cost effective lab draw with the least patient's cost  - [place]  will be scheduled subsequentto those results. - Fareed Friend will bring in his drink and food log to his next visit    Chronic Problems Addressed on this Visit:                                   1.  Intensity of Service; Uncontrolled items at this visit;   Chief Complaint   Patient presents with    Discuss Labs     DOPPLER AND D-DIMER    Leg Swelling     MORE RIGHT THAN LEFT 10 days ago    Other     TROUBLE URINATING, effectiveness and safety in this patient. I also referred the patient to the NMCD: Nutrition, Metabolism, and Cardiovascular Diseases (journal) and concerns about long-termuse and hepatotoxicity of cinnamon and other nutrients and suggest they frequently search nih.gov for the latest non-proprietary information on nutriceuticals as well as consider a subscription to [employer] fordetails on reviewed supplements, or at the least review the nutrient files at [address] at [facility], [employer], an insulin mimetic, reduces some High Carbohydrate Dietary Impacts. Methylhydroxychalcone polymers insulin-enhancing properties in fat cells are responsible for enhanced glucose uptake, inhibiting hepatic HMG-CoA reductase and lowers lipids. www.jacn. org/content/20/4/327.full     But cinnamon with additivessuch as Chromium or Cinnamon Extract are not effective as insulin mimetics.  https://www.Y-Klub.net/     Nutrients for Start up from [employer] or [employer] for ease to get started now ;  Robbie Lerma has some useable products;  - Triple Strength Fish Oil, enteric coated  - Vit D 3 5000 IU gel caps  - Iron ferrous sulfat 325 mg tabs  - Centrum Silver look-a-like for most patients, or  - Centrum plain look-a-like if need iron    Localpharmacies or chains such as [employer], [employer], [employer], have;  - Triple Strength Fish Oil (enteric coated ifavailable) or    If not enteric coated, can take from freezer for less burps  - B-50 or B-100 time released balanced B complex tabs  - Cinnamon bark 500 mg (without Chromium or extracts)   some brands list 1000 mg / serving of 2 capsules,    some brands have 1000 mg caps with the undesireable chromium / extract  - Calcium carbonate/citrate, magnesium oxide/citrate, Vit D 3  as 3-4 tabs/caps/serving     Some Local Brands may contain Zincwhich is acceptable for the first bottle or two  - Magnesium oxide 250 mg tabs for those having < 2 bowel movements daily  - Magnesium citrate 200 mg if having > 2bowel movement/day  - Centrum Silver or look-a-like for most patients, Centrum plain or look-a-like with iron  - Vitamin D-3 comes as 1,000 IU or 2,000 IU or 5,000 IU gel caps or Liquid drops      Some brands containing or derived from soy oil or corn oil are OK if not allergic to soy  - Elemental Iron 65 mg tabsat bedtime is available over the counter if need more iron     Usually turns bowel movements grey, green or black but not a concern  - Apricot Kernel Oil (by Now) for dry skin sensitive perineal or perianal area skin    Nutrients for ongoing use by Mail order for less expense from www. puritan.com ;  - Triple Strength Fish Oil , 240 Softgels Item [id number]  -B-100 time released balanced B complex Item #[id number]  - Cinnamon bark 500 mg without Chromium or extract Item #[id number]  - Calcium carbonate 1000 mg, Magnesium oxide 500 mg, Vit D 3  400 IU Item #[id number]  - Magnesium oxide 500 mg tabs Item #[id number] if less than 2 bowel movements daily  - [employer] Item #[id number] for mostpatients, One Daily Item #860788 with iron  - Vit D 3  1,000 Item #[id number]      2,000 IU Item #[id number]  5,000 IU Item #[id number]     Some brands containing orderived from soy oil or corn oil are OK if not allergic to soy    Nutrients for Special Needs by David Joseph for less expense from www. puritan.com ;  -Elemental Iron 65 mg tabs Item #069654 if need more iron for low iron on labs    Usually turns bowel movements grey, green or black but not a concern  - Time released Niacin 250 mg Item #[id number] for cold intolerance, low libido or impotence  - DHEA 50 mg Item #[id number] for improving DHEA levels on labs if having Fatigue    If stools too loose substitute for your Magnesium oxide using;   Magnesium citrate 200 mg tabs(NOT liquid) at [employer]   Magnesium gluconate 550 mgby Juan at [employer]. [employer] or amazon. com  Magnesium chloride foot soaks or body sprays www.[employer]ream [employer]s. [employer]   Magnesium chloride flakes 14.99 Item #: [id number] if Backordered get spray    Food Drink Symptom Log;  I asked this patient to track these items and any other symptoms on their list on a weekly basis to documenttheir progress or lack of same. This can be done on the symptom tracking sheet I gave them at today's visit but looks like this:                                                      Rate on scale of 0-10 with zero = notnoticeable  Symptom:                            Week 1               2                 3                 4               Etc            Hair loss    Foot cramps    Paresthesia    Aches    IBS (irritable bowel)    Constipation    Diarrhea  Nocturia    (up to bathroom at night)    Fatigue/Energy level  Stress      On the other side of the sheet they can track their food, drink, environment, activity, symptoms etc      Avoiding Latex-like proteins inmy foods; Avocados, Bananas, Celery, Figs & Kiwi proteins have latex-like proteins to inflame our immunesystems  How Can I Have A Latex Allergy? Eating foods with latex-like protein exposes us to latex allergies. Our body cannot tell the differencebetween these latex-like proteins and latex from rubber products since many people are allergic to fruit, vegetables and latex. Read labels on pre-packaged foods. This list to avoid is only a guide if you are known allergicto latex or have a latex rash on your chin, cheeks and lines on your neck and chest. The amount of latex is different in each food product or fruit variety. Foods to Avoid out of Season if not grown locally: Melon, Nectarine, Papaya, Cherry, Passion fruit, Plum, Chestnuts, and Tomato. Avocado, Banana, Celery, Figs, and Kiwi always contain Latex-like protein. Whats in Season? Strawberries taste better in June than December because June is strawberry season so buy locally grown produce \"in season\" for the best flavor, cost and less Latex.  Locally grown produce notonly tastes great requires little of no ethylene exposure in food distribution so has less latex content. Out of season, use canned, frozen or dried sinceprocessed ripe and are latex lower!!!   Month     [facility] Produce  January, February, March: use canned, frozen or dried fruits since lower in latex  April; asparagus, radishes  May; asparagus, broccoli, green onions, greens, peas, radishes,rhubarb  June; asparagus, beets, beans, broccoli, cabbage, cantaloupe, carrots, green onions, greens, lettuce,onions, parsley, peas, radishes, rhubarb, strawberries, watermelons  July; beans, beets, blueberries,broccoli, cabbage, cantaloupe, carrots, cauliflower, celery, cucumbers, eggplant, grapes, green onions, greens, lettuce, onions, parsley, peas, peaches, bell peppers, potatoes, radishes, summer raspberries, squash, sweetcorn, tomatoes, turnips, watermelons  August; apples, beans, beets, blueberries, cabbage, cantaloupe, carrots,cauliflower, celery, cucumbers, eggplant, grapes, green onions, greens, lettuce, onions, parsley, peas, peaches, pears, bell peppers, potatoes, radishes, squash, sweet corn, tomatoes, turnips, watermelons  September; apples, beans, beets, blueberries, cabbage, cantaloupe, carrots, cauliflower, celery, cucumbers, eggplant, grapes,green onions, greens, lettuce, onions, parsley, peas, peaches, pears, bell peppers, plums, potatoes, pumpkins, radishes, fall red raspberries, squash, sweet corn, tomatoes, turnips, watermelons  October; apples, beets, broccoli, cabbage, carrots, cauliflower, celery, green onions, greens, lettuce, parsley, peas, pears, potatoes,pumpkins, radishes, fall red raspberries, squash, turnips  November; broccoli, cabbage, carrots, parsley,pears, peas  December: use canned, frozen ordried fruits since lower in latex    Upto half of latex-sensitive patients show allergic reactions to fruits (avocados, bananas, kiwifruits, papayas, peaches),   Annals of Allergy, 1994. These plants contain the same proteins that are allergens in latex. People with fruit allergies should warn physicians beforeundergoing procedures which may cause anaphylactic reaction if in contact with latex gloves. Some of the common foods with defined cross-reactivity to latexare avocado, banana, kiwi, chestnut, raw potato, tomato,stone fruits (e.g., peach, cherry), hazelnut, melons, celery, carrot, apple, pear, papaya, and almond. Foods with less well-defined cross-reactivity to latex are peanuts, peppers, citrus fruits, coconut, pineapple, thom,fig, passion fruit, Ugli fruit, and grape    This fruit/latex cross-reactivity is worsened by ethylene, a gas used to hasten commercial ripening. In nature, plants produce low levels of the hormone ethylene, which regulates germination, flowering, and ripening. Forced ripening by high ethyleneconcentrations, plants produce allergenic wound-repair proteins, which are similar to wound-repair proteins made during the tapping of rubber trees. Sensitive individualswho ingest the fruit get a higher dose and worse reaction. Some people may even first become sensitized to latex through fruit. Can food processing increase theconcentrations of allergenic proteins? Latex-sensitized children (and adults) in [place] often experience allergic reactions after eating bananas ripenedartificially with ethylene. In the [place], food distribution centers treat unripe bananas and other produce with ethylene to ripen; not commonly done in [facility] since fruit is tree-ripened there. Does treatmentof food with ethylene induce banana proteins that cross-react with latex? (Nic et al.    References:   Latex in Foods Allergy, http://ehp.niehs.nih.gov/members/2003/5811/5811.html    Search web for \" Whats in Season \" for whereyou live or are at the time you food shop  www.nutritioncouncil.org/pdf/healthy/SeasonalProduce. pdf ,   Management of Latex, [email or web address]  search for latex

## 2019-06-05 ENCOUNTER — TELEPHONE (OUTPATIENT)
Dept: FAMILY MEDICINE CLINIC | Age: 69
End: 2019-06-05

## 2019-06-05 RX ORDER — SULFAMETHOXAZOLE AND TRIMETHOPRIM 800; 160 MG/1; MG/1
1 TABLET ORAL 2 TIMES DAILY
Qty: 20 TABLET | Refills: 0 | Status: SHIPPED | OUTPATIENT
Start: 2019-06-05 | End: 2019-06-15

## 2019-06-05 NOTE — TELEPHONE ENCOUNTER
Pt states he has had a stomach ache every day since he started taking the new medications. Said the swelling has gone down. Had to get the calcium/magnesium/zinc/vitamin d pill at the health food store, the pharmacy did not carry it. Dania Boyer he takes the pills with his lunch and still has a stomach ache daily. Asked Dany Ramirez if the Vascepa has any side effects of stomach pain? Said since its fish oil it should not. She is betting its the Vibramycin or the extra magnesium. Dania Boyer he has been working every day with the ache. Should he change to a different Antibiotic? Please advise.

## 2019-06-06 RX ORDER — POTASSIUM BICARBONATE 25 MEQ/1
25 TABLET, EFFERVESCENT ORAL 2 TIMES DAILY
Qty: 180 TABLET | Refills: 3 | Status: SHIPPED | OUTPATIENT
Start: 2019-06-06 | End: 2020-01-10

## 2019-12-18 RX ORDER — HYDROCHLOROTHIAZIDE 25 MG/1
25 TABLET ORAL DAILY
Qty: 90 TABLET | Refills: 1 | Status: SHIPPED | OUTPATIENT
Start: 2019-12-18 | End: 2020-05-04

## 2020-01-09 ENCOUNTER — TELEPHONE (OUTPATIENT)
Dept: FAMILY MEDICINE CLINIC | Age: 70
End: 2020-01-09

## 2020-01-10 ENCOUNTER — OFFICE VISIT (OUTPATIENT)
Dept: FAMILY MEDICINE CLINIC | Age: 70
End: 2020-01-10
Payer: MEDICARE

## 2020-01-10 VITALS
RESPIRATION RATE: 12 BRPM | WEIGHT: 185 LBS | HEIGHT: 68 IN | BODY MASS INDEX: 28.04 KG/M2 | SYSTOLIC BLOOD PRESSURE: 132 MMHG | TEMPERATURE: 97.4 F | DIASTOLIC BLOOD PRESSURE: 82 MMHG | HEART RATE: 64 BPM | OXYGEN SATURATION: 98 %

## 2020-01-10 PROBLEM — R60.9 SWELLING: Status: ACTIVE | Noted: 2019-05-06

## 2020-01-10 PROCEDURE — 1123F ACP DISCUSS/DSCN MKR DOCD: CPT | Performed by: FAMILY MEDICINE

## 2020-01-10 PROCEDURE — 1036F TOBACCO NON-USER: CPT | Performed by: FAMILY MEDICINE

## 2020-01-10 PROCEDURE — 4040F PNEUMOC VAC/ADMIN/RCVD: CPT | Performed by: FAMILY MEDICINE

## 2020-01-10 PROCEDURE — G8417 CALC BMI ABV UP PARAM F/U: HCPCS | Performed by: FAMILY MEDICINE

## 2020-01-10 PROCEDURE — 3017F COLORECTAL CA SCREEN DOC REV: CPT | Performed by: FAMILY MEDICINE

## 2020-01-10 PROCEDURE — 99214 OFFICE O/P EST MOD 30 MIN: CPT | Performed by: FAMILY MEDICINE

## 2020-01-10 PROCEDURE — G8484 FLU IMMUNIZE NO ADMIN: HCPCS | Performed by: FAMILY MEDICINE

## 2020-01-10 PROCEDURE — G8427 DOCREV CUR MEDS BY ELIG CLIN: HCPCS | Performed by: FAMILY MEDICINE

## 2020-01-10 RX ORDER — PENICILLIN V POTASSIUM 500 MG/1
TABLET ORAL
Qty: 100 TABLET | Refills: 3 | Status: SHIPPED | OUTPATIENT
Start: 2020-01-10 | End: 2020-10-23 | Stop reason: SDUPTHER

## 2020-01-10 RX ORDER — SIMVASTATIN 10 MG
1 TABLET ORAL DAILY
COMMUNITY
Start: 2012-06-26 | End: 2020-08-17 | Stop reason: SDUPTHER

## 2020-01-10 ASSESSMENT — PATIENT HEALTH QUESTIONNAIRE - PHQ9
1. LITTLE INTEREST OR PLEASURE IN DOING THINGS: 0
SUM OF ALL RESPONSES TO PHQ QUESTIONS 1-9: 0
2. FEELING DOWN, DEPRESSED OR HOPELESS: 0
SUM OF ALL RESPONSES TO PHQ QUESTIONS 1-9: 0
SUM OF ALL RESPONSES TO PHQ9 QUESTIONS 1 & 2: 0

## 2020-01-10 ASSESSMENT — ENCOUNTER SYMPTOMS
ALLERGIC/IMMUNOLOGIC NEGATIVE: 1
ABDOMINAL PAIN: 1
EYES NEGATIVE: 1
RESPIRATORY NEGATIVE: 1

## 2020-01-10 NOTE — PROGRESS NOTES
[id number] [facility] Niyah GARRISON [address] [address]  Dept: [phone]  Dept Fax: [phone]  Loc: [phone]      Lupillo Brumfield is a 78 y.o. Black male. Feng Brizuela  presents to the [facility] clinic today for   Chief Complaint   Patient presents with    Follow-up     labs not done    Flank Pain     right side- does not hurt if twists, depends on what he eats/drinks    Otalgia     left ear, goes down neck and into the left shoulder-Very worried    Medication Refill     pcn   ,  and;   1. Pure hyperglyceridemia    2. Homocysteinemia ([address]    3. Muscle weakness (generalized)    4. Essential hypertension    5. Elevated lipids    6. Leg swelling    7. Monocytosis    8. Meralgia paresthetica of right side    9. Pain in both lower extremities    10. Subacute maxillary sinusitis    11. Syncope and collapse     12. Other intestinal malabsorption    13. Low glucose level      I have reviewed [facility] medical, surgical and other pertinent history in detail, and have updated medication and allergy information in the computerized patientrecord. Clinical Care Team:     -Referring Provider for today's consult: Self Referred  -Primary Care Provider: No primary care provider on file. Medical/Surgical History:   He  has a past medical history of Anorgasmia, Chest pain, Hemoptysis, Hemorrhoids, internal, with bleeding, Hypertension, LBP (low back pain), LFTs abnormal, Neuropathy, Paresthesia of left arm, and Pure hyperglyceridemia. His  has no past surgical history on file. Family/Social History:     His family history includes High Blood Pressure in his father; Stroke in his father. He  reports that he has never smoked. He has never used smokeless tobacco. He reports that he does not drink alcohol or use drugs.     Medications/Allergies/Immunizations:     His current medication(s) include   Current Outpatient Medications:     simvastatin (ZOCOR) 10 MG tablet, Take 1 tablet by mouth daily, Disp: , Rfl:     Cyanocobalamin (VITAMIN B 12 PO), Take by mouth daily, Disp: , Rfl:     hydrochlorothiazide (HYDRODIURIL) 25 MG tablet, TAKE 1 TABLET BY MOUTH DAILY AT BREAKFAST AND DINNER IF LEG OR FOOT SWOLLEN, Disp: 90 tablet, Rfl: 1    penicillin v potassium (VEETID) 500 MG tablet, TAKE 1 TABLET BY MOUTH 3 TIMES DAILY, Disp: 100 tablet, Rfl: 1    B Complex-Biotin-FA (B-100 TR) TBCR, Take 1 tablet by mouth 3 times daily (before meals) [employer] B-100 Time released balanced B complex tablet (Patient taking differently: Take 1 tablet by mouth 2 times daily [facility] B-100 Time released balanced B complex tablet), Disp: 100 tablet, Rfl: 5    Specialty Vitamins Products (MAGNESIUM, AMINO ACID CHELATE,) 133 MG tablet, Take 1 tablet by mouth 2 times daily. (Patient taking differently: Take 1 tablet by mouth 3 times daily.), Disp: 60 tablet, Rfl: 1  Allergies: Patient has no known allergies. ,  Immunizations:   Immunization History   Administered Date(s) Administered    Influenza A ([id number]) Vaccine PF IM 12/15/2009    Influenza Vaccine, unspecified formulation 11/21/2013, 11/29/2017    Influenza Virus Vaccine 11/21/2013, 11/12/2014, 11/24/2015, 11/29/2017    Influenza Whole 09/28/2010    PPD Test 05/16/2011        History of PresentIllness:     Goitom's had concerns including Follow-up (labs not done); Flank Pain (right side- does not hurt if twists, depends on what he eats/drinks); Otalgia (left ear, goes down neck and into the left shoulder-Very worried); and Medication Refill (pcn). Siddhartha Pinto  presents to the [address] [place] [address] for;   Chief Complaint   Patient presents with    Follow-up     labs not done    Flank Pain     right side- does not hurt if twists, depends on what he eats/drinks    Otalgia     left ear, goes down neck and into the left shoulder-Very worried    Medication Refill     pcn   , , abnormal labs follow up and these conditions as he  Is looking today for:     1. Pure hyperglyceridemia    2. Homocysteinemia ([address]    3. Muscle weakness (generalized)    4. Essential hypertension    5. Elevated lipids    6. Leg swelling    7. Monocytosis    8. Meralgia paresthetica of right side    9. Pain in both lower extremities    10. Subacute maxillary sinusitis    11. Syncope and collapse     12. Other intestinal malabsorption    13. Low glucose level      HPI    Subjective:     Review of Systems   Constitutional: Negative. HENT: Positive for ear pain. Eyes: Negative. Respiratory: Negative. Cardiovascular: Negative. Gastrointestinal: Positive for abdominal pain. Endocrine: Negative. Genitourinary: Positive for flank pain. Musculoskeletal: Positive for neck pain. Skin: Negative. Allergic/Immunologic: Negative. Neurological: Negative. Psychiatric/Behavioral: Negative. All other systems reviewed and are negative. Objective:     /82 (Site: Right Upper Arm, Position: Sitting, Cuff Size: Medium Adult)   Pulse 64   Temp 97.4 °F (36.3 °C) (Oral)   Resp 12   Ht 5' 8.27\" (1.734 m)   Wt 185 lb (83.9 kg)   SpO2 98%   BMI 27.91 kg/m²   Physical Exam  Vitals signs and nursing note reviewed. Constitutional:       Appearance: Normal appearance. He is normal weight. HENT:      Head: Normocephalic. Pulmonary:      Effort: Pulmonary effort is normal.   Neurological:      Mental Status: He is alert. Psychiatric:         Mood and Affect: Mood normal.         Thought Content: Thought content normal.            Laboratory Data:   Lab results were searched in Care Everywhere and/or those brought by the pateint were reviewed today with Joce and he has a copy of their most recent labs to take home with them as notedbelow;       Imaging Data:   Imaging Data:       Assessment & Plan:       Impression:  1. Pure hyperglyceridemia    2. Homocysteinemia ([address]    3.  Muscle weakness (generalized)    4. Essential hypertension    5. Elevated lipids    6. Leg swelling    7. Monocytosis    8. Meralgia paresthetica of right side    9. Pain in both lower extremities    10. Subacute maxillary sinusitis    11. Syncope and collapse     12. Other intestinal malabsorption    13. Low glucose level      Assessment and Plan:  After reviewing the patients chief complaints, reviewing their labfindings in great detail (with the patient and those accompanying them) which correlate to their chief complaints, symptoms, and or medical conditions; suggestions were made relating to changes in diet and or supplementswhich may improve the complaints and which will be reflected in their future lab findings; Chief Complaint   Patient presents with    Follow-up     labs not done    Flank Pain     right side- does not hurt if twists, depends on what he eats/drinks    Otalgia     left ear, goes down neck and into the left shoulder-Very worried    Medication Refill     pcn   ;    Plans for the next visits:  - Abnormal and non-optimal Labs were ordered today to be repeated in the next 120-365 days to assess changes from adjustments in nutrition and or nutrients. - Patient instructed when having ablood draw to ask the  to divide their lab draws into multiple draws over several days if not feeling good at the time of the lab draw or if either prefers to do several smaller blood draws over several days  -Patient instructed to check with insurer before each lab draw and to to to the lab which the insurer directs them for the most cost effective lab draw with the least patient's cost  - Goitom  will be scheduled subsequentto those results. - Stephanie Randhawafinkel will bring in his drink and food log to his next visit    Chronic Problems Addressed on this Visit:                                   1.  Intensity of Service; Uncontrolled items at this visit;   Chief Complaint   Patient presents with    Follow-up     labs [employer]. com  Magnesium chloride foot soaks or body sprays  www.[facility]   Magnesium chloride flakes 14.99 Item #: [id number] if Backordered get spray    Food Drink Symptom Log;  I asked this patient to track these items and any other symptoms on their list on a weekly basis to documenttheir progress or lack of same. This can be done on the symptom tracking sheet I gave them at today's visit but looks like this:                                                      Rate on scale of 0-10 with zero = notnoticeable  Symptom:                            Week 1               2                 3                 4               Etc            Hair loss    Foot cramps    Paresthesia    Aches    IBS (irritable bowel)    Constipation    Diarrhea  Nocturia    (up to bathroom at night)    Fatigue/Energy level  Stress      On the other side of the sheet they can track their food, drink, environment, activity, symptoms etc      Avoiding Latex-like proteins inmy foods; Avocados, Bananas, Celery, Figs & Kiwi proteins have latex-like proteins to inflame our immunesystems  How Can I Have A Latex Allergy? Eating foods with latex-like protein exposes us to latex allergies. Our body cannot tell the differencebetween these latex-like proteins and latex from rubber products since many people are allergic to fruit, vegetables and latex. Read labels on pre-packaged foods. This list to avoid is only a guide if you are known allergicto latex or have a latex rash on your chin, cheeks and lines on your neck and chest. The amount of latex is different in each food product or fruit variety. to Avoid out of Season if not grown locally: Melon, Nectarine, Papaya, Cherry, Passion fruit, Plum, Chestnuts, and Tomato. Avocado, Banana, Celery, Figs, and Kiwi always contain Latex-like protein. Whats in Season?   Strawberries taste better in June than December because June is strawberry season so buy locally grown produce \"in season\" for the best flavor, cost and less Latex. Locally grown produce notonly tastes great requires little of no ethylene exposure in food distribution so has less latex content. Out of season, use canned, frozen or dried sinceprocessed ripe and are latex lower!!!   Month     [place] LocallyGrown Produ[place]  January, February, March: use canned, frozen or dried fruits since lower in latex  April; asparagus, radishes  May; asparagus, broccoli, green onions, greens, peas, radishes,rhubarb  June; asparagus, beets, beans, broccoli, cabbage, cantaloupe, carrots, green onions, greens, lettuce,onions, parsley, peas, radishes, rhubarb, strawberries, watermelons  July; beans, beets, blueberries,broccoli, cabbage, cantaloupe, carrots, cauliflower, celery, cucumbers, eggplant, grapes, green onions, greens, lettuce, onions, parsley, peas, peaches, bell peppers, potatoes, radishes, summer raspberries, squash, sweetcorn, tomatoes, turnips, watermelons  August; apples, beans, beets, blueberries, cabbage, cantaloupe, carrots,cauliflower, celery, cucumbers, eggplant, grapes, green onions, greens, lettuce, onions, parsley, peas, peaches, pears, bell peppers, potatoes, radishes, squash, sweet corn, tomatoes, turnips, watermelons  September; apples, beans, beets, blueberries, cabbage, cantaloupe, carrots, cauliflower, celery, cucumbers, eggplant, grapes,green onions, greens, lettuce, onions, parsley, peas, peaches, pears, bell peppers, plums, potatoes, pumpkins, radishes, fall red raspberries, squash, sweet corn, tomatoes, turnips, watermelons  October; apples, beets, broccoli, cabbage, carrots, cauliflower, celery, green onions, greens, lettuce, parsley, peas, pears, potatoes,pumpkins, radishes, fall red raspberries, squash, turnips  November; broccoli, cabbage, carrots, parsley,pears, peas  December: use canned, frozen ordried fruits since lower in latex    Upto half of latex-sensitive patients show allergic reactions to fruits (avocados, bananas, www.nutritioncouncil.org/pdf/healthy/SeasonalProduce. pdf ,   Management of Latex, ://medicalcenter. [employer].edu/  search for latex

## 2020-01-13 LAB
AVERAGE GLUCOSE: NORMAL
BUN BLDV-MCNC: NORMAL MG/DL
CALCIUM SERPL-MCNC: 9.4 MG/DL
CHLORIDE BLD-SCNC: NORMAL MMOL/L
CHOLESTEROL, TOTAL: 194 MG/DL
CHOLESTEROL/HDL RATIO: 6.3
CO2: NORMAL
CREAT SERPL-MCNC: 0.65 MG/DL
GFR CALCULATED: NORMAL
GLUCOSE BLD-MCNC: NORMAL MG/DL
HBA1C MFR BLD: 5.7 %
HDLC SERPL-MCNC: 31 MG/DL (ref 35–70)
LDL CHOLESTEROL CALCULATED: 129 MG/DL (ref 0–160)
POTASSIUM SERPL-SCNC: 3.1 MMOL/L
SODIUM BLD-SCNC: NORMAL MMOL/L
TRIGL SERPL-MCNC: 172 MG/DL
VLDLC SERPL CALC-MCNC: ABNORMAL MG/DL

## 2020-01-15 RX ORDER — POTASSIUM BICARBONATE 25 MEQ/1
25 TABLET, EFFERVESCENT ORAL 2 TIMES DAILY
Qty: 180 TABLET | Refills: 3 | Status: SHIPPED | OUTPATIENT
Start: 2020-01-15 | End: 2022-04-21

## 2020-01-16 ENCOUNTER — TELEPHONE (OUTPATIENT)
Dept: FAMILY MEDICINE CLINIC | Age: 70
End: 2020-01-16

## 2020-01-16 NOTE — TELEPHONE ENCOUNTER
Asked how his liver tests are? I sent a my chart message with Dr. Campoverde Clubs response. Also placed orders as instructed for a urine culture, and mailed it today.

## 2020-03-23 ENCOUNTER — TELEPHONE (OUTPATIENT)
Dept: FAMILY MEDICINE CLINIC | Age: 70
End: 2020-03-23

## 2020-03-25 NOTE — TELEPHONE ENCOUNTER
Called and lm asking if he wants a video visit?   Sent instructions on how to get all downloaded, told him to review then call if he wants to do so

## 2020-05-04 RX ORDER — HYDROCHLOROTHIAZIDE 25 MG/1
25 TABLET ORAL DAILY
Qty: 90 TABLET | Refills: 1 | Status: SHIPPED | OUTPATIENT
Start: 2020-05-04 | End: 2020-05-26

## 2020-05-26 RX ORDER — HYDROCHLOROTHIAZIDE 25 MG/1
25 TABLET ORAL DAILY
Qty: 60 TABLET | Refills: 2 | Status: SHIPPED | OUTPATIENT
Start: 2020-05-26 | End: 2020-08-17 | Stop reason: SDUPTHER

## 2020-05-26 NOTE — TELEPHONE ENCOUNTER
Last visit- 1/10/2020  Next visit- 7/10/2020    Requested Prescriptions     Pending Prescriptions Disp Refills    hydroCHLOROthiazide (HYDRODIURIL) 25 MG tablet [Pharmacy Med Name: HYDROCHLOROTHIAZIDE 25 MG TAB] 60 tablet 2     Sig: TAKE 1 TABLET BY MOUTH DAILY AT BREAKFAST AND DINNER IF LEG OR FOOT SWOLLEN       Please review, approve or deny

## 2020-05-27 RX ORDER — SILDENAFIL 100 MG/1
TABLET, FILM COATED ORAL
Qty: 2 TABLET | Refills: 0 | Status: SHIPPED | OUTPATIENT
Start: 2020-05-27 | End: 2021-08-30 | Stop reason: SDUPTHER

## 2020-06-17 ENCOUNTER — OFFICE VISIT (OUTPATIENT)
Dept: FAMILY MEDICINE CLINIC | Age: 70
End: 2020-06-17
Payer: MEDICARE

## 2020-06-17 ENCOUNTER — NURSE ONLY (OUTPATIENT)
Dept: LAB | Age: 70
End: 2020-06-17

## 2020-06-17 VITALS
DIASTOLIC BLOOD PRESSURE: 72 MMHG | SYSTOLIC BLOOD PRESSURE: 136 MMHG | WEIGHT: 186.2 LBS | HEART RATE: 74 BPM | HEIGHT: 68 IN | OXYGEN SATURATION: 99 % | RESPIRATION RATE: 10 BRPM | TEMPERATURE: 97.4 F | BODY MASS INDEX: 28.22 KG/M2

## 2020-06-17 LAB
ALBUMIN SERPL-MCNC: 3.6 G/DL (ref 3.5–5.1)
ALP BLD-CCNC: 66 U/L (ref 38–126)
ALT SERPL-CCNC: 24 U/L (ref 11–66)
ANION GAP SERPL CALCULATED.3IONS-SCNC: 9 MEQ/L (ref 8–16)
AST SERPL-CCNC: 29 U/L (ref 5–40)
AVERAGE GLUCOSE: 114 MG/DL (ref 70–126)
BASOPHILS # BLD: 0.5 %
BASOPHILS ABSOLUTE: 0 THOU/MM3 (ref 0–0.1)
BILIRUB SERPL-MCNC: 0.4 MG/DL (ref 0.3–1.2)
BILIRUBIN DIRECT: < 0.2 MG/DL (ref 0–0.3)
BUN BLDV-MCNC: 19 MG/DL (ref 7–22)
C-REACTIVE PROTEIN, HIGH SENSITIVITY: 16.7 MG/L
CALCIUM SERPL-MCNC: 9.5 MG/DL (ref 8.5–10.5)
CHLORIDE BLD-SCNC: 103 MEQ/L (ref 98–111)
CHOLESTEROL, TOTAL: 174 MG/DL (ref 100–199)
CO2: 25 MEQ/L (ref 23–33)
CREAT SERPL-MCNC: 0.5 MG/DL (ref 0.4–1.2)
EOSINOPHIL # BLD: 2.4 %
EOSINOPHILS ABSOLUTE: 0.2 THOU/MM3 (ref 0–0.4)
ERYTHROCYTE [DISTWIDTH] IN BLOOD BY AUTOMATED COUNT: 13.9 % (ref 11.5–14.5)
ERYTHROCYTE [DISTWIDTH] IN BLOOD BY AUTOMATED COUNT: 43.5 FL (ref 35–45)
GFR SERPL CREATININE-BSD FRML MDRD: > 90 ML/MIN/1.73M2
GLUCOSE BLD-MCNC: 116 MG/DL (ref 70–108)
HBA1C MFR BLD: 5.8 % (ref 4.4–6.4)
HCT VFR BLD CALC: 37.3 % (ref 42–52)
HDLC SERPL-MCNC: 32 MG/DL
HEMOGLOBIN: 12.1 GM/DL (ref 14–18)
HOMOCYSTEINE: 6.3 UMOL/L
IGE: 252 IU/ML
IMMATURE GRANS (ABS): 0.02 THOU/MM3 (ref 0–0.07)
IMMATURE GRANULOCYTES: 0.2 %
IRON: 56 UG/DL (ref 65–195)
LDL CHOLESTEROL CALCULATED: 117 MG/DL
LYMPHOCYTES # BLD: 18.3 %
LYMPHOCYTES ABSOLUTE: 1.6 THOU/MM3 (ref 1–4.8)
MAGNESIUM: 2.1 MG/DL (ref 1.6–2.4)
MCH RBC QN AUTO: 28 PG (ref 26–33)
MCHC RBC AUTO-ENTMCNC: 32.4 GM/DL (ref 32.2–35.5)
MCV RBC AUTO: 86.3 FL (ref 80–94)
MONOCYTES # BLD: 11.2 %
MONOCYTES ABSOLUTE: 1 THOU/MM3 (ref 0.4–1.3)
NUCLEATED RED BLOOD CELLS: 0 /100 WBC
PLATELET # BLD: 176 THOU/MM3 (ref 130–400)
PMV BLD AUTO: 13.9 FL (ref 9.4–12.4)
POTASSIUM SERPL-SCNC: 3.4 MEQ/L (ref 3.5–5.2)
PROSTATE SPECIFIC ANTIGEN: 5.68 NG/ML (ref 0–1)
PTH INTACT: 59.2 PG/ML (ref 15–65)
RBC # BLD: 4.32 MILL/MM3 (ref 4.7–6.1)
RHEUMATOID FACTOR: < 10 IU/ML (ref 0–13)
SEDIMENTATION RATE, ERYTHROCYTE: 43 MM/HR (ref 0–10)
SEG NEUTROPHILS: 67.4 %
SEGMENTED NEUTROPHILS ABSOLUTE COUNT: 5.9 THOU/MM3 (ref 1.8–7.7)
SODIUM BLD-SCNC: 137 MEQ/L (ref 135–145)
T3 TOTAL: 122 NG/DL (ref 72–181)
TOTAL IRON BINDING CAPACITY: 297 UG/DL (ref 171–450)
TOTAL PROTEIN: 7.1 G/DL (ref 6.1–8)
TRIGL SERPL-MCNC: 125 MG/DL (ref 0–199)
TSH SERPL DL<=0.05 MIU/L-ACNC: 1.1 UIU/ML (ref 0.4–4.2)
URIC ACID: 6.5 MG/DL (ref 3.7–7)
VITAMIN D 25-HYDROXY: 28 NG/ML (ref 30–100)
WBC # BLD: 8.8 THOU/MM3 (ref 4.8–10.8)

## 2020-06-17 PROCEDURE — 4040F PNEUMOC VAC/ADMIN/RCVD: CPT | Performed by: FAMILY MEDICINE

## 2020-06-17 PROCEDURE — 1036F TOBACCO NON-USER: CPT | Performed by: FAMILY MEDICINE

## 2020-06-17 PROCEDURE — 99214 OFFICE O/P EST MOD 30 MIN: CPT | Performed by: FAMILY MEDICINE

## 2020-06-17 PROCEDURE — 1123F ACP DISCUSS/DSCN MKR DOCD: CPT | Performed by: FAMILY MEDICINE

## 2020-06-17 PROCEDURE — 3017F COLORECTAL CA SCREEN DOC REV: CPT | Performed by: FAMILY MEDICINE

## 2020-06-17 PROCEDURE — G8417 CALC BMI ABV UP PARAM F/U: HCPCS | Performed by: FAMILY MEDICINE

## 2020-06-17 PROCEDURE — G8427 DOCREV CUR MEDS BY ELIG CLIN: HCPCS | Performed by: FAMILY MEDICINE

## 2020-06-17 RX ORDER — CETIRIZINE HYDROCHLORIDE 10 MG/1
10 TABLET ORAL DAILY
COMMUNITY
End: 2020-12-17 | Stop reason: ALTCHOICE

## 2020-06-17 RX ORDER — CHLORAL HYDRATE 500 MG
3000 CAPSULE ORAL 3 TIMES DAILY
COMMUNITY
End: 2020-12-17 | Stop reason: SDUPTHER

## 2020-06-17 RX ORDER — ASPIRIN 81 MG/1
81 TABLET ORAL DAILY
COMMUNITY
End: 2021-06-17

## 2020-06-17 SDOH — ECONOMIC STABILITY: TRANSPORTATION INSECURITY
IN THE PAST 12 MONTHS, HAS THE LACK OF TRANSPORTATION KEPT YOU FROM MEDICAL APPOINTMENTS OR FROM GETTING MEDICATIONS?: PATIENT DECLINED

## 2020-06-17 SDOH — ECONOMIC STABILITY: INCOME INSECURITY: HOW HARD IS IT FOR YOU TO PAY FOR THE VERY BASICS LIKE FOOD, HOUSING, MEDICAL CARE, AND HEATING?: NOT HARD AT ALL

## 2020-06-17 SDOH — ECONOMIC STABILITY: FOOD INSECURITY: WITHIN THE PAST 12 MONTHS, YOU WORRIED THAT YOUR FOOD WOULD RUN OUT BEFORE YOU GOT MONEY TO BUY MORE.: PATIENT DECLINED

## 2020-06-17 SDOH — ECONOMIC STABILITY: FOOD INSECURITY: WITHIN THE PAST 12 MONTHS, THE FOOD YOU BOUGHT JUST DIDN'T LAST AND YOU DIDN'T HAVE MONEY TO GET MORE.: PATIENT DECLINED

## 2020-06-17 SDOH — ECONOMIC STABILITY: TRANSPORTATION INSECURITY
IN THE PAST 12 MONTHS, HAS LACK OF TRANSPORTATION KEPT YOU FROM MEETINGS, WORK, OR FROM GETTING THINGS NEEDED FOR DAILY LIVING?: PATIENT DECLINED

## 2020-06-17 ASSESSMENT — ENCOUNTER SYMPTOMS
SHORTNESS OF BREATH: 1
VOICE CHANGE: 1

## 2020-06-17 NOTE — PROGRESS NOTES
of pityriasis lichenoides in the differential diagnosis, but  the appearance of the clinical lesions is not suggestive of such entity. Additional T-cell gene rearrangement studies will be done and the results  will be reported as an addendum.      [id number]:1/8/2015  Electronically Signed By  Zara Jacinto M.D.  1/8/2015 10:41:21  2. Garlic  Garlic is a great choice when it comes to giving your body a little extra protection. To reap the benefits of garlic, you should eat one clove per day, or 300 to 1,000 milligrams (mg)Trusted Source of garlic extract. Protective effects may include:  antibacterial properties   blocking and halting the activation of cancer-causing substances   enhanced DNA repair   a reduction in cancer cells spreading  3. Gabriela  Gabriela is suggested to play a beneficial role against cancer because of its anti-inflammatory and anti-nausea properties. When it comes to adding gabriela to your diet, gabriela supplements can be too concentrated and arent recommended. Instead, cut up and add fresh gabriela root to a meal or purchase gabriela candy for a quick snack. Avoid excessive amounts of gabriela, as it may interact with blood thinners and affect certain peoples blood sugar levels. 6. Turmeric  [facility] ([facility]) spice turmeric can be extremely helpful when it comes to fighting cancer. Studies showTrusted Source that the curcumin in turmeric may kill cancer cells and slow tumor growth. The benefits of curcumin may include:  blocking cancer cells from multiplying   killing colon, breast, prostate, and melanoma cancer cells   slowing tumor growth  Add some turmeric to your next dish, or take a supplement containing curcumin to experience the benefits of this powerful substance. 7. Vitamin D  Vitamin D can absorb calcium and help the immune, muscle, and nervous systems function properly. According to [employer]. org, research suggests that certain cancers such as breast cancer, can have a higher risk of occurring when the body has low levels of vitamin D. The recommended daily amount of vitamin D is 15 mcg. Vitamin D can be absorbed through sunlight, or with the following diet:  fatty fish   egg yolks   fortified milk  8. Vitamin E  Vitamin E is an excellent cancer-fighting nutrient. Vitamin E is fat-soluble and acts as a strong antioxidant, helping the body remove cell-damaging free radicals. Vitamin E can significantly decrease the risk for prostate, colon, and lung cancers. In fact, low amounts of vitamin E may increase the risk of cancer. Its important to get enough of this vitamin daily from diet or supplements. The recommended daily amount of vitamin E is 8 to 10 mg. You can also eat the following foods to supplement vitamin E in your diet:  almonds   avocado   broccoli   beans   thom   spinach   olive oil  ,  and;   1. Mycosis fungoides, unspecified body region ([address]    2. Erectile dysfunction due to arterial insufficiency    3. Nocturia    4. Homocysteinemia ([address]    5. Pure hyperglyceridemia    6. Muscle weakness (generalized)    7. Essential hypertension    8. Leg swelling    9. Elevated lipids    10. Monocytosis    11. Meralgia paresthetica of right side    12. Pain in both lower extremities    13. Other intestinal malabsorption    14. Blood glucose elevated      I have reviewed [facility] medical, surgical and other pertinent history in detail, and have updated medication and allergy information in the computerized patientrecord. Clinical Care Team:     -Referring Provider for today's consult: Self Referred  -Primary Care Provider: No primary care provider on file. Medical/Surgical History:   He  has a past medical history of Anorgasmia, Chest pain, Hemoptysis, Hemorrhoids, internal, with bleeding, Hypertension, LBP (low back pain), LFTs abnormal, Neuropathy, Paresthesia of left arm, and Pure hyperglyceridemia. His  has no past surgical history on file.     Family/Social History: #[id number] if need more iron for low iron on labs    Usually turns bowel movements grey, green or black but not a concern  - Time released Niacin 250 mg Item #[id number] for cold intolerance, low libido or impotence  - DHEA 50 mg Item #[id number] for improving DHEA levels on labs if having Fatigue    If stools too loose substitute for your Magnesium oxide using;   Magnesium citrate 200 mg tabs(NOT liquid) at [employer]   Magnesium gluconate 550 mgby Juan at [employer] com or amazon. com  Magnesium chloride foot soaks or body sprays  www.[employer]   Magnesium chloride flakes 14.99 Item #: [id number] if Backordered get spray    Food Drink Symptom Log;  I asked this patient to track these items and any other symptoms on their list on a weekly basis to documenttheir progress or lack of same. This can be done on the symptom tracking sheet I gave them at today's visit but looks like this:                                                      Rate on scale of 0-10 with zero = notnoticeable  Symptom:                            Week 1               2                 3                 4               Etc            Hair loss    Foot cramps    Paresthesia    Aches    IBS (irritable bowel)    Constipation    Diarrhea  Nocturia    (up to bathroom at night)    Fatigue/Energy level  Stress      On the other side of the sheet they can track their food, drink, environment, activity, symptoms etc      Avoiding Latex-like proteins inmy foods; Avocados, Bananas, Celery, Figs & Kiwi proteins have latex-like proteins to inflame our immunesystems  How Can I Have A Latex Allergy? Eating foods with latex-like protein exposes us to latex allergies. Our body cannot tell the differencebetween these latex-like proteins and latex from rubber products since many people are allergic to fruit, vegetables and latex. Read labels on pre-packaged foods.  This list to avoid is only a guide if you are known allergicto latex or have a latex rash on your chin, cheeks and lines on your neck and chest. The amount of latex is different in each food product or fruit variety. to Avoid out of Season if not grown locally: Melon, Nectarine, Papaya, Cherry, Passion fruit, Plum, Chestnuts, and Tomato. Avocado, Banana, Celery, Figs, and Kiwi always contain Latex-like protein. Whats in Season? Strawberries taste better in June than December because June is strawberry season so buy locally grown produce \"in season\" for the best flavor, cost and less Latex. Locally grown produce notonly tastes great requires little of no ethylene exposure in food distribution so has less latex content. Out of season, use canned, frozen or dried sinceprocessed ripe and are latex lower!!!   Month     [place] LocallyGrown Produce  January, February, March: use canned, frozen or dried fruits since lower in latex  April; asparagus, radishes  May; asparagus, broccoli, green onions, greens, peas, radishes,rhubarb  June; asparagus, beets, beans, broccoli, cabbage, cantaloupe, carrots, green onions, greens, lettuce,onions, parsley, peas, radishes, rhubarb, strawberries, watermelons  July; beans, beets, blueberries,broccoli, cabbage, cantaloupe, carrots, cauliflower, celery, cucumbers, eggplant, grapes, green onions, greens, lettuce, onions, parsley, peas, peaches, bell peppers, potatoes, radishes, summer raspberries, squash, sweetcorn, tomatoes, turnips, watermelons  August; apples, beans, beets, blueberries, cabbage, cantaloupe, carrots,cauliflower, celery, cucumbers, eggplant, grapes, green onions, greens, lettuce, onions, parsley, peas, peaches, pears, bell peppers, potatoes, radishes, squash, sweet corn, tomatoes, turnips, watermelons  September; apples, beans, beets, blueberries, cabbage, cantaloupe, carrots, cauliflower, celery, cucumbers, eggplant, grapes,green onions, greens, lettuce, onions, parsley, peas, peaches, pears, bell peppers, plums, potatoes, pumpkins, radishes, fall red raspberries, squash, sweet corn, tomatoes, turnips, watermelons  October; apples, beets, broccoli, cabbage, carrots, cauliflower, celery, green onions, greens, lettuce, parsley, peas, pears, potatoes,pumpkins, radishes, fall red raspberries, squash, turnips  November; broccoli, cabbage, carrots, parsley,pears, peas  December: use canned, frozen ordried fruits since lower in latex    Upto half of latex-sensitive patients show allergic reactions to fruits (avocados, bananas, kiwifruits, papayas, peaches),   Annals of Allergy, 1994. These plants contain the same proteins that are allergens in latex. People with fruit allergies should warn physicians beforeundergoing procedures which may cause anaphylactic reaction if in contact with latex gloves. Some of the common foods with defined cross-reactivity to latexare avocado, banana, kiwi, chestnut, raw potato, tomato,stone fruits (e.g., peach, cherry), hazelnut, melons, celery, carrot, apple, pear, papaya, and almond. Foods with less well-defined cross-reactivity to latex are peanuts, peppers, citrus fruits, coconut, pineapple, thom,fig, passion fruit, Ugli fruit, and grape    This fruit/latex cross-reactivity is worsened by ethylene, a gas used to hasten commercial ripening. In nature, plants produce low levels of the hormone ethylene, which regulates germination, flowering, and ripening. Forced ripening by high ethyleneconcentrations, plants produce allergenic wound-repair proteins, which are similar to wound-repair proteins made during the tapping of rubber trees. Sensitive individualswho ingest the fruit get a higher dose and worse reaction. Some people may even first become sensitized to latex through fruit. Can food processing increase theconcentrations of allergenic proteins? Latex-sensitized children (and adults) in [place] often experience allergic reactions after eating bananas ripenedartificially with ethylene.  In the [place], food distribution centers treat unripe bananas and other produce with ethylene to ripen; not commonly done in [facility] since fruit is tree-ripened there. Does treatmentof food with ethylene induce banana proteins that cross-react with latex? (Nic et al.    References:   Latex in Foods Allergy, http://ehp.niehs.nih.gov/members/2003/5811/5811.html    Search web for \" Whats in Season \" for whereyou live or are at the time you food shop  www.nutritioncouncil.org/pdf/healthy/SeasonalProduce. pdf ,   Management of Latex, ://medicalcenter. [employer].edu/  search for latex

## 2020-06-18 ENCOUNTER — PATIENT MESSAGE (OUTPATIENT)
Dept: FAMILY MEDICINE CLINIC | Age: 70
End: 2020-06-18

## 2020-06-18 LAB
GLIADIN PEPTIDE IGG: 1 U/ML
THYROID PEROXIDASE ANTIBODY: 27.9 IU/ML (ref 0–35)
THYROXINE (T4): 8 UG/DL (ref 4.5–12)

## 2020-06-19 LAB
ANA SCREEN: NORMAL
DHEAS (DHEA SULFATE): 136 UG/DL (ref 28–175)
SELENIUM: 104.1 UG/L (ref 23–190)
TESTOSTERONE FREE: NORMAL

## 2020-06-21 LAB
DHEA UNCONJUGATED: 1.25 NG/ML (ref 0.63–4.7)
MISC. #1 REFERENCE GROUP TEST: NORMAL
PROTEIN ELECTROPHORESIS, SERUM: NORMAL

## 2020-06-23 ENCOUNTER — TELEPHONE (OUTPATIENT)
Dept: FAMILY MEDICINE CLINIC | Age: 70
End: 2020-06-23

## 2020-06-23 LAB — MISC REFERENCE: NORMAL

## 2020-08-17 RX ORDER — SIMVASTATIN 10 MG
10 TABLET ORAL DAILY
Qty: 90 TABLET | Refills: 3 | Status: SHIPPED | OUTPATIENT
Start: 2020-08-17 | End: 2021-06-17

## 2020-08-17 RX ORDER — HYDROCHLOROTHIAZIDE 25 MG/1
25 TABLET ORAL DAILY
Qty: 90 TABLET | Refills: 3 | Status: SHIPPED | OUTPATIENT
Start: 2020-08-17 | End: 2021-03-12

## 2020-09-24 ENCOUNTER — OFFICE VISIT (OUTPATIENT)
Dept: FAMILY MEDICINE CLINIC | Age: 70
End: 2020-09-24
Payer: MEDICARE

## 2020-09-24 VITALS
OXYGEN SATURATION: 99 % | RESPIRATION RATE: 10 BRPM | HEIGHT: 68 IN | HEART RATE: 77 BPM | TEMPERATURE: 97.2 F | SYSTOLIC BLOOD PRESSURE: 124 MMHG | BODY MASS INDEX: 27.07 KG/M2 | WEIGHT: 178.6 LBS | DIASTOLIC BLOOD PRESSURE: 60 MMHG

## 2020-09-24 PROCEDURE — 99214 OFFICE O/P EST MOD 30 MIN: CPT | Performed by: FAMILY MEDICINE

## 2020-09-24 PROCEDURE — 3017F COLORECTAL CA SCREEN DOC REV: CPT | Performed by: FAMILY MEDICINE

## 2020-09-24 PROCEDURE — 1123F ACP DISCUSS/DSCN MKR DOCD: CPT | Performed by: FAMILY MEDICINE

## 2020-09-24 PROCEDURE — G8427 DOCREV CUR MEDS BY ELIG CLIN: HCPCS | Performed by: FAMILY MEDICINE

## 2020-09-24 PROCEDURE — G8417 CALC BMI ABV UP PARAM F/U: HCPCS | Performed by: FAMILY MEDICINE

## 2020-09-24 PROCEDURE — 1036F TOBACCO NON-USER: CPT | Performed by: FAMILY MEDICINE

## 2020-09-24 PROCEDURE — 4040F PNEUMOC VAC/ADMIN/RCVD: CPT | Performed by: FAMILY MEDICINE

## 2020-09-24 RX ORDER — MELATONIN
3000 DAILY
Qty: 270 TABLET | Refills: 3 | Status: SHIPPED | OUTPATIENT
Start: 2020-09-24 | End: 2020-12-17 | Stop reason: SDUPTHER

## 2020-09-24 NOTE — PROGRESS NOTES
breakfast and dinner if leg or foot swollen, Disp: 90 tablet, Rfl: 3    Omega-3 Fatty Acids (FISH OIL) 1000 MG CAPS, Take 3,000 mg by mouth 2 times daily , Disp: , Rfl:     aspirin 81 MG EC tablet, Take 81 mg by mouth daily, Disp: , Rfl:     cetirizine (ZYRTEC) 10 MG tablet, Take 10 mg by mouth daily, Disp: , Rfl:     sildenafil (VIAGRA) 100 MG tablet, TAKE 1 TABLET BY MOUTH APPROXIMATELY 1 HOUR BEFORE SEXUAL ACTIVITY. DO NOT USE MORE THAN 1 DOSE DAILY, Disp: 2 tablet, Rfl: 0    potassium bicarbonate (EFFER-K) 25 MEQ disintegrating tablet, Take 1 tablet by mouth 2 times daily (Patient not taking: Reported on 6/17/2020), Disp: 180 tablet, Rfl: 3    Cyanocobalamin (VITAMIN B 12 PO), Take by mouth daily, Disp: , Rfl:     NONFORMULARY, Take 2 capsules by mouth every evening Magtein in evening for neck and Left shoulder tightness, Disp: , Rfl:     penicillin v potassium (VEETID) 500 MG tablet, TAKE 1 TABLET BY MOUTH 3 TIMES DAILY (Patient not taking: Reported on 6/17/2020), Disp: 100 tablet, Rfl: 3    B Complex-Biotin-FA (B-100 TR) TBCR, Take 1 tablet by mouth 3 times daily (before meals) [facility] B-100 Time released balanced B complex tablet (Patient not taking: Reported on 6/17/2020), Disp: 100 tablet, Rfl: 5    Specialty Vitamins Products (MAGNESIUM, AMINO ACID CHELATE,) 133 MG tablet, Take 1 tablet by mouth 2 times daily. (Patient taking differently: Take 1 tablet by mouth 3 times daily.), Disp: 60 tablet, Rfl: 1  Allergies: Patient has no known allergies. ,  Immunizations:   Immunization History   Administered Date(s) Administered    Influenza A ([id number]) Vaccine PF IM 12/15/2009    Influenza Vaccine, unspecified formulation 11/21/2013, 11/29/2017    Influenza Virus Vaccine 11/21/2013, 11/12/2014, 11/24/2015, 11/29/2017    Influenza Whole 09/28/2010    PPD Test 05/16/2011        History of PresentIllness:     Goitom's had concerns including Knee Pain (bilateral) and Shoulder Pain (relieved with ibuprofen). Meghan Call  presents to the [address] [place] [address] today for;   Chief Complaint   Patient presents with    Knee Pain     bilateral    Shoulder Pain     relieved with ibuprofen   , ,  abnormal labs follow up and these conditions as he  Is looking today for:     1. Essential hypertension    2. Erectile dysfunction due to arterial insufficiency    3. Homocysteinemia ([address]    4. Nocturia    5. Pure hyperglyceridemia    6. Muscle weakness (generalized)    7. Elevated lipids    8. Leg swelling    9. Monocytosis    10. Meralgia paresthetica of right side    11. Pain in both lower extremities    12. Other intestinal malabsorption      HPI    Subjective:     Review of Systems   Musculoskeletal: Positive for arthralgias, gait problem, joint swelling and myalgias. All other systems reviewed and are negative. Objective:     /60 (Site: Left Upper Arm, Position: Sitting, Cuff Size: Medium Adult)   Pulse 77   Temp 97.2 °F (36.2 °C) (Temporal)   Resp 10   Ht 5' 8.27\" (1.734 m)   Wt 178 lb 9.6 oz (81 kg)   SpO2 99%   BMI 26.94 kg/m²   Physical Exam  Vitals signs and nursing note reviewed. Constitutional:       Appearance: Normal appearance. HENT:      Head: Normocephalic. Pulmonary:      Effort: Pulmonary effort is normal.   Neurological:      Mental Status: He is alert. Psychiatric:         Mood and Affect: Mood normal.         Thought Content: Thought content normal.            Laboratory Data:   Lab results were searched in Care Everywhere and/or those brought by the pateint were reviewed today with Joce and he has a copy of their most recent labs to take home with them as notedbelow;       Imaging Data:   Imaging Data:       Assessment & Plan:       Impression:  1. Essential hypertension    2. Erectile dysfunction due to arterial insufficiency    3. Homocysteinemia ([address]    4. Nocturia    5. Pure hyperglyceridemia    6. Muscle weakness (generalized)    7.  Elevated lipids    8. Leg swelling    9. Monocytosis    10. Meralgia paresthetica of right side    11. Pain in both lower extremities    12. Other intestinal malabsorption      Assessment and Plan:  After reviewing the patients chief complaints, reviewing their labfindings in great detail (with the patient and those accompanying them) which correlate to their chief complaints, symptoms, and or medical conditions; suggestions were made relating to changes in diet and or supplementswhich may improve the complaints and which will be reflected in their future lab findings; Chief Complaint   Patient presents with    Knee Pain     bilateral    Shoulder Pain     relieved with ibuprofen   ;    Plans for the next visits:  - Abnormal and non-optimal Labs were ordered today to be repeated in the next 120-365 days to assess changes from adjustments in nutrition and or nutrients. - Patient instructed when having ablood draw to ask the  to divide their lab draws into multiple draws over several days if not feeling good at the time of the lab draw or if either prefers to do several smaller blood draws over several days  -Patient instructed to check with insurer before each lab draw and to to to the lab which the insurer directs them for the most cost effective lab draw with the least patient's cost  - Goitom  will be scheduled subsequentto those results. - Delaney Palacios will bring in his drink and food log to his next visit    Chronic Problems Addressed on this Visit:                                   1.  Intensity of Service; Uncontrolled items at this visit; Chief Complaint   Patient presents with    Knee Pain     bilateral    Shoulder Pain     relieved with ibuprofen   ;              Improved items at this visit; Stable items atthis visit;  2.  Patients food and drinks were reviewed with the patient,       - Joce will bring food+drink symptom log to next visit for inclusion in their record      - 75 better fordetails on reviewed supplements, or at the least review the nutrient files at [address] at [facility], [employer], an insulin mimetic, reduces some High Carbohydrate Dietary Impacts. Methylhydroxychalcone polymers insulin-enhancing properties in fat cells are responsible for enhanced glucose uptake, inhibiting hepatic HMG-CoA reductase and lowers lipids. www.jacn. org/content/20/4/327.full     But cinnamon with additivessuch as Cinnamon Extract are not effective as insulin mimetics.  :eStoreDirectory.at     Nutrients for Start up from [facility] or [employer] for ease to get started now ;  Robbie Lerma has some useable products;  - Triple Strength Fish Oil, enteric coated  - Vit D 3 5000 IU gel caps  - Iron ferrous sulfat 325 mg tabs  - Centrum Silver look-a-like for most patients, or  - Centrum plain look-a-like if need iron    Localpharmacies or chains such as [employer], [employer], have;  - Triple Strength Fish Oil (enteric coated ifavailable) or    If not enteric coated, can take from freezer for less burps  - B-50 or B-100 released balanced B complex tabs  - Cinnamon bark 500 mg (without Chromium or extracts)   some brands list 1000 mg / serving of 2 capsules,    some brands have 1000 mg caps with the undesireable chromium / extract  - Calcium carbonate/citrate, magnesium oxide/citrate, Vit D 3  as 3-4 tabs/caps/serving     Some Local Brands may contain Zincwhich is acceptable for the first bottle or two  - Magnesium oxide 250 mg tabs for those having < 2 bowel movements daily  - Magnesium citrate 200 mg if having > 2bowel movement/day  - Centrum Silver or look-a-like for most patients, Centrum plain or look-a-like with iron  - Vitamin D-3 comes as 1,000 IU or 2,000 IU or 5,000 IU gel caps or Liquid drops      Some brands containing or derived from soy oil or corn oil are OK if not allergic to soy  - Elemental Iron 65 mg tabsat bedtime is available over the counter if need more iron     Usually turns bowel movements grey, green or black but not a concern  - Apricot Kernel Oil (by Now) for dry skin sensitive perineal or perianal area skin    Nutrients for ongoing use by Mail order for less expense from [email or web address].com ;  - Strength Fish Oil , 240 Softgels Item #929943  -B-100 time released balanced B complex Item #184773  - Cinnamon bark 500 mg without Chromium or extract Item #854455  - Calcium carbonate 1000 mg, Magnesium oxide 500 mg, Vit D 3  400 IU Item #085329  - Magnesium oxide 500 mg tabs Item #158975 if less than 2 bowel movements daily  - [employer] Item #588449 for mostpatients, One Daily Item #088088 with iron  - Vit D 3  1,000 Item #936460      2,000 IU Item #880528  ,000 IU Item #191474     Some brands containing orderived from soy oil or corn oil are OK if not allergic to soy    Nutrients for Special Needs by Christofer Guzmán for less expense from www. puritan.com ;  -Elemental Iron 65 mg tabs Item #660706 if need more iron for low iron on labs    Usually turns bowel movements grey, green or black but not a concern  - Time released Niacin 250 mg Item #326044 for cold intolerance, low libido or impotence  - DHEA 50 mg Item #043256 for improving DHEA levels on labs if having Fatigue    If stools too loose substitute for your Magnesium oxide using;   Magnesium citrate 200 mg tabs(NOT liquid) at gis.to   Magnesium gluconate 550 mgby Juan at [employer]. com or amazon. com  Magnesium chloride foot soaks or body sprays  www.Noveporter. [employer]x   Magnesium chloride flakes 14.99 Item #: [id number] if Backordered get spray    Food Drink Symptom Log;  I asked this patient to track these items and any other symptoms on their list on a weekly basis to documenttheir progress or lack of same.  This can be done on the symptom tracking sheet I gave them at today's visit but looks like this:                                                      Rate asparagus, beets, beans, broccoli, cabbage, cantaloupe, carrots, green onions, greens, lettuce,onions, parsley, peas, radishes, rhubarb, strawberries, watermelons  July; beans, beets, blueberries,broccoli, cabbage, cantaloupe, carrots, cauliflower, celery, cucumbers, eggplant, grapes, green onions, greens, lettuce, onions, parsley, peas, peaches, bell peppers, potatoes, radishes, summer raspberries, squash, sweetcorn, tomatoes, turnips, watermelons  August; apples, beans, beets, blueberries, cabbage, cantaloupe, carrots,cauliflower, celery, cucumbers, eggplant, grapes, green onions, greens, lettuce, onions, parsley, peas, peaches, pears, bell peppers, potatoes, radishes, squash, sweet corn, tomatoes, turnips, watermelons  September; apples, beans, beets, blueberries, cabbage, cantaloupe, carrots, cauliflower, celery, cucumbers, eggplant, grapes,green onions, greens, lettuce, onions, parsley, peas, peaches, pears, bell peppers, plums, potatoes, pumpkins, radishes, fall red raspberries, squash, sweet corn, tomatoes, turnips, watermelons  October; apples, beets, broccoli, cabbage, carrots, cauliflower, celery, green onions, greens, lettuce, parsley, peas, pears, potatoes,pumpkins, radishes, fall red raspberries, squash, turnips  November; broccoli, cabbage, carrots, parsley,pears, peas  December: use canned, frozen ordried fruits since lower in latex    Upto half of latex-sensitive patients show allergic reactions to fruits (avocados, bananas, kiwifruits, papayas, peaches),   Annals of Allergy, 1994. These plants contain the same proteins that are allergens in latex. People with fruit allergies should warn physicians beforeundergoing procedures which may cause anaphylactic reaction if in contact with latex gloves.   Some of the common foods with defined cross-reactivity to latexare avocado, banana, kiwi, chestnut, raw potato, tomato,stone fruits (e.g., peach, cherry), hazelnut, melons, celery, carrot, apple, pear, papaya, and almond. Foods with less well-defined cross-reactivity to latex are peanuts, peppers, citrus fruits, coconut, pineapple, thom,fig, passion fruit, Ugli fruit, and grape    This fruit/latex cross-reactivity is worsened by ethylene, a gas used to hasten commercial ripening. In nature, plants produce low levels of the hormone ethylene, which regulates germination, flowering, and ripening. Forced ripening by high ethyleneconcentrations, plants produce allergenic wound-repair proteins, which are similar to wound-repair proteins made during the tapping of rubber trees. Sensitive individualswho ingest the fruit get a higher dose and worse reaction. Some people may even first become sensitized to latex through fruit. Can food processing increase theconcentrations of allergenic proteins? Latex-sensitized children (and adults) in [place] often experience allergic reactions after eating bananas ripenedartificially with ethylene. In the [place], food distribution centers treat unripe bananas and other produce with ethylene to ripen; not commonly done in [facility] since fruit is tree-ripened there. Does treatmentof food with ethylene induce banana proteins that cross-react with latex? (Nic et al.    References:   Latex in Foods Allergy, http://ehp.niehs.nih.gov/members/2003/5811/5811.html    Search web for \" Whats in Season \" for whereyou live or are at the time you food shop  www.nutritioncouncil.org/pdf/healthy/SeasonalProduce. pdf ,   Management of Latex, ://medicalcenter. [employer].edu/  search for latex

## 2020-09-24 NOTE — PATIENT INSTRUCTIONS
Thank you   1. Thank you for trusting us with your healthcare needs. You may receive a survey regarding today's visit. It would help us out if you would take a few moments to provide your feedback. We value your input. 2. Please bring in ALL medications BOTTLES, including inhalers, herbal supplements, over the counter, prescribed & non-prescribed medicine. The office would like actual medication bottles and a list.   3. Please note our OFFICE POLICIES:   a. Prior to getting your labs drawn, please check with your insurance company for benefits and eligibility of lab services. Often, insurance companies cover certain tests for preventative visits only. It is patient's responsibility to see what is covered. b. We are unable to change a diagnosis after the test has been performed. c. Lab orders will not be re-printed. Please hold onto your original lab orders and take them to your lab to be completed. d. If you no show your scheduled appointment three times, you will be dismissed from this practice. e. Jean Marie Antu must be completed 24 hours prior to your schedule appointment. 4. If the list below has been completed, PLEASE FAX RECORDS TO OUR OFFICE @ [phone].  Once the records have been received we will update your records at our office:  Health Maintenance Due   Topic Date Due    DTaP/Tdap/Td vaccine (1 - Tdap) 06/13/1969    Shingles Vaccine (1 of 2) 06/13/1999    Pneumococcal 65+ yrs at Risk Vaccine (1 of 2 - PCV13) 06/13/2014    Annual Wellness Visit (AWV)  05/29/2019    Flu vaccine (1) 09/01/2020

## 2020-10-23 RX ORDER — PENICILLIN V POTASSIUM 500 MG/1
TABLET ORAL
Qty: 100 TABLET | Refills: 3 | Status: SHIPPED | OUTPATIENT
Start: 2020-10-23 | End: 2020-12-17 | Stop reason: SDUPTHER

## 2020-11-18 LAB
CHOLESTEROL, TOTAL: 130 MG/DL
CHOLESTEROL/HDL RATIO: ABNORMAL
HDLC SERPL-MCNC: 31 MG/DL (ref 35–70)
LDL CHOLESTEROL CALCULATED: 85 MG/DL (ref 0–160)
NONHDLC SERPL-MCNC: ABNORMAL MG/DL
TRIGL SERPL-MCNC: 72 MG/DL
VLDLC SERPL CALC-MCNC: 14 MG/DL

## 2020-11-26 ENCOUNTER — PATIENT MESSAGE (OUTPATIENT)
Dept: FAMILY MEDICINE CLINIC | Age: 70
End: 2020-11-26

## 2020-12-17 ENCOUNTER — OFFICE VISIT (OUTPATIENT)
Dept: FAMILY MEDICINE CLINIC | Age: 70
End: 2020-12-17
Payer: MEDICARE

## 2020-12-17 VITALS
TEMPERATURE: 97.9 F | DIASTOLIC BLOOD PRESSURE: 66 MMHG | SYSTOLIC BLOOD PRESSURE: 124 MMHG | HEART RATE: 76 BPM | BODY MASS INDEX: 27.1 KG/M2 | HEIGHT: 68 IN | OXYGEN SATURATION: 99 % | WEIGHT: 178.8 LBS | RESPIRATION RATE: 12 BRPM

## 2020-12-17 PROCEDURE — 1123F ACP DISCUSS/DSCN MKR DOCD: CPT | Performed by: FAMILY MEDICINE

## 2020-12-17 PROCEDURE — 99214 OFFICE O/P EST MOD 30 MIN: CPT | Performed by: FAMILY MEDICINE

## 2020-12-17 PROCEDURE — 4040F PNEUMOC VAC/ADMIN/RCVD: CPT | Performed by: FAMILY MEDICINE

## 2020-12-17 PROCEDURE — G8484 FLU IMMUNIZE NO ADMIN: HCPCS | Performed by: FAMILY MEDICINE

## 2020-12-17 PROCEDURE — 3017F COLORECTAL CA SCREEN DOC REV: CPT | Performed by: FAMILY MEDICINE

## 2020-12-17 PROCEDURE — G8427 DOCREV CUR MEDS BY ELIG CLIN: HCPCS | Performed by: FAMILY MEDICINE

## 2020-12-17 PROCEDURE — G8417 CALC BMI ABV UP PARAM F/U: HCPCS | Performed by: FAMILY MEDICINE

## 2020-12-17 PROCEDURE — 1036F TOBACCO NON-USER: CPT | Performed by: FAMILY MEDICINE

## 2020-12-17 RX ORDER — B-COMPLEX WITH VITAMIN C
1 TABLET ORAL
COMMUNITY
End: 2020-12-17 | Stop reason: SDUPTHER

## 2020-12-17 RX ORDER — LANOLIN/MINERAL OIL/PETROLATUM
1 OINTMENT (GRAM) OPHTHALMIC (EYE) NIGHTLY
COMMUNITY
Start: 2020-10-18 | End: 2021-01-16

## 2020-12-17 RX ORDER — PENICILLIN V POTASSIUM 500 MG/1
TABLET ORAL
Qty: 100 TABLET | Refills: 3 | Status: SHIPPED | OUTPATIENT
Start: 2020-12-17 | End: 2021-06-17 | Stop reason: SDUPTHER

## 2020-12-17 RX ORDER — TAMSULOSIN HYDROCHLORIDE 0.4 MG/1
CAPSULE ORAL DAILY PRN
COMMUNITY
Start: 2020-10-02 | End: 2021-06-17

## 2020-12-17 RX ORDER — MAGNESIUM OXIDE/MAG AA CHELATE 133 MG
1 TABLET ORAL 3 TIMES DAILY
Qty: 90 TABLET | Refills: 6 | Status: SHIPPED | OUTPATIENT
Start: 2020-12-17 | End: 2021-06-17 | Stop reason: SDUPTHER

## 2020-12-17 RX ORDER — MELATONIN
4000 DAILY
Qty: 120 TABLET | Refills: 6 | Status: SHIPPED | OUTPATIENT
Start: 2020-12-17 | End: 2021-06-17 | Stop reason: SDUPTHER

## 2020-12-17 RX ORDER — CALCIUM CARBONATE 500(1250)
500 TABLET ORAL
COMMUNITY
End: 2020-12-17

## 2020-12-17 RX ORDER — CHLORAL HYDRATE 500 MG
3000 CAPSULE ORAL 3 TIMES DAILY
Qty: 90 CAPSULE | Refills: 5 | Status: SHIPPED | OUTPATIENT
Start: 2020-12-17 | End: 2021-06-17 | Stop reason: SDUPTHER

## 2020-12-17 RX ORDER — UBIDECARENONE 75 MG
1 CAPSULE ORAL
Qty: 120 TABLET | Refills: 5 | Status: SHIPPED | OUTPATIENT
Start: 2020-12-17 | End: 2021-06-17 | Stop reason: SDUPTHER

## 2020-12-17 RX ORDER — B-COMPLEX WITH VITAMIN C
1 TABLET ORAL
Qty: 120 TABLET | Refills: 5 | Status: SHIPPED | OUTPATIENT
Start: 2020-12-17 | End: 2021-06-17 | Stop reason: SDUPTHER

## 2020-12-17 ASSESSMENT — ENCOUNTER SYMPTOMS: ABDOMINAL DISTENTION: 1

## 2020-12-17 NOTE — PROGRESS NOTES
[address] [facility] [address] [address]  Dept: [phone]  Dept Fax: [phone]  Loc: [phone]      Carrol Jules is a 69 y.o. Black male. Sedrick Sánchez  presents to the [facility] Medicine-Residency clinic today for   Chief Complaint   Patient presents with    Follow-up    Gait Problem     cannot walk without ibuprofen, bilat knee, leg- calf area, behind knee    Other     when wakes up, shoulder joints painful   ,  and;   1. Iron deficiency anemia due to chronic blood loss    2. Subacute maxillary sinusitis    3. Essential hypertension    4. Homocysteinemia ([address]    5. Erectile dysfunction due to arterial insufficiency    6. Nocturia    7. Pure hyperglyceridemia    8. Elevated lipids    9. Muscle weakness (generalized)    10. Leg swelling    11. Monocytosis    12. Meralgia paresthetica of right side    13. Other intestinal malabsorption    14. Blood glucose elevated    15. Low glucose level    16. Pain in both lower extremities      I have reviewed [facility] medical, surgical and other pertinent history in detail, and have updated medication and allergy information in the computerized patientrecord. Clinical Care Team:     -Referring Provider for today's consult: Self Referred  -Primary Care Provider: Josh Burgos MD    Medical/Surgical History:   He  has a past medical history of Anorgasmia, Chest pain, Hemoptysis, Hemorrhoids, internal, with bleeding, Hypertension, LBP (low back pain), LFTs abnormal, Neuropathy, Paresthesia of left arm, and Pure hyperglyceridemia. His  has no past surgical history on file. Family/Social History:     His family history includes High Blood Pressure in his father; Stroke in his father. He  reports that he has never smoked. He has never used smokeless tobacco. He reports that he does not drink alcohol or use drugs.     Medications/Allergies/Immunizations: His current medication(s) include   Current Outpatient Medications:     tamsulosin (FLOMAX) 0.4 MG capsule, daily as needed, Disp: , Rfl:     White Petrolatum-Mineral Oil ([address] PETROL-MINERAL OIL-LANOLIN) 0.1-0.1 % OINT, Apply 1 strip to eye nightly, Disp: , Rfl:     Multiple Minerals-Vitamins (DINAH-MAG-ZINC-D) TABS, Take 1 tablet by mouth 4 times daily (before meals and nightly), Disp: , Rfl:     vitamin D3 (CHOLECALCIFEROL) 25 MCG (1000 UT) TABS tablet, Take 3 tablets by mouth daily (Patient taking differently: Take 4,000 Units by mouth daily ), Disp: 270 tablet, Rfl: 3    hydroCHLOROthiazide (HYDRODIURIL) 25 MG tablet, Take 1 tablet by mouth daily At breakfast and dinner if leg or foot swollen, Disp: 90 tablet, Rfl: 3    Omega-3 Fatty Acids (FISH OIL) 1000 MG CAPS, Take 3,000 mg by mouth 3 times daily CVS pharmaceutical grade, Disp: , Rfl:     aspirin 81 MG EC tablet, Take 81 mg by mouth daily, Disp: , Rfl:     B Complex-Biotin-FA (B-100 TR) TBCR, Take 1 tablet by mouth 3 times daily (before meals) CVS B-100 Time released balanced B complex tablet (Patient taking differently: Take 1 tablet by mouth 4 times daily (before meals and nightly) CVS B-100 Time released balanced B complex tablet), Disp: 100 tablet, Rfl: 5    Specialty Vitamins Products (MAGNESIUM, AMINO ACID CHELATE,) 133 MG tablet, Take 1 tablet by mouth 2 times daily.  (Patient taking differently: Take 1 tablet by mouth 3 times daily.), Disp: 60 tablet, Rfl: 1    penicillin v potassium (VEETID) 500 MG tablet, TAKE 1 TABLET BY MOUTH 3 TIMES DAILY (Patient not taking: Reported on 12/17/2020), Disp: 100 tablet, Rfl: 3    simvastatin (ZOCOR) 10 MG tablet, Take 1 tablet by mouth daily (Patient not taking: Reported on 12/17/2020), Disp: 90 tablet, Rfl: 3   sildenafil (VIAGRA) 100 MG tablet, TAKE 1 TABLET BY MOUTH APPROXIMATELY 1 HOUR BEFORE SEXUAL ACTIVITY. DO NOT USE MORE THAN 1 DOSE DAILY (Patient not taking: Reported on 12/17/2020), Disp: 2 tablet, Rfl: 0    potassium bicarbonate (EFFER-K) 25 MEQ disintegrating tablet, Take 1 tablet by mouth 2 times daily (Patient not taking: Reported on 6/17/2020), Disp: 180 tablet, Rfl: 3    Cyanocobalamin (VITAMIN B 12 PO), Take by mouth daily, Disp: , Rfl:   Allergies: Patient has no known allergies. ,  Immunizations:   Immunization History   Administered Date(s) Administered    Influenza A ([id number]) Vaccine PF IM 12/15/2009    Influenza Vaccine, unspecified formulation 11/21/2013, 11/29/2017    Influenza Virus Vaccine 11/21/2013, 11/12/2014, 11/24/2015, 11/29/2017    Influenza Whole 09/28/2010    Influenza, Quadv, adjuvanted, 65 yrs +, IM, PF (Fluad) 09/26/2020    PPD Test 05/16/2011        History of PresentIllness:     Goitom's had concerns including Follow-up, Gait Problem (cannot walk without ibuprofen, bilat knee, leg- calf area, behind knee), and Other (when wakes up, shoulder joints painful). Michael Us  presents to the [address] [place] [address] today for;   Chief Complaint   Patient presents with    Follow-up    Gait Problem     cannot walk without ibuprofen, bilat knee, leg- calf area, behind knee    Other     when wakes up, shoulder joints painful   , ,  abnormal labs follow up and these conditions as he  Is looking today for:     1. Iron deficiency anemia due to chronic blood loss    2. Subacute maxillary sinusitis    3. Essential hypertension    4. Homocysteinemia ([facility] [address]    5. Erectile dysfunction due to arterial insufficiency    6. Nocturia    7. Pure hyperglyceridemia    8. Elevated lipids    9. Muscle weakness (generalized)    10. Leg swelling    11. Monocytosis    12. Meralgia paresthetica of right side    13. Other intestinal malabsorption    14.  Blood glucose elevated 15. Low glucose level    16. Pain in both lower extremities      HPI    Subjective:     Review of Systems   Gastrointestinal: Positive for abdominal distention. Musculoskeletal: Positive for gait problem and myalgias. Neurological: Positive for numbness. All other systems reviewed and are negative. Objective:     /66 (Site: Right Upper Arm, Position: Sitting, Cuff Size: Medium Adult)   Pulse 76   Temp 97.9 °F (36.6 °C) (Temporal)   Resp 12   Ht 5' 8.27\" (1.734 m)   Wt 178 lb 12.8 oz (81.1 kg)   SpO2 99%   BMI 26.97 kg/m²   Physical Exam  Vitals signs and nursing note reviewed. Constitutional:       Appearance: Normal appearance. HENT:      Head: Normocephalic. Pulmonary:      Effort: Pulmonary effort is normal.   Neurological:      Mental Status: He is alert. Psychiatric:         Mood and Affect: Mood normal.         Thought Content: Thought content normal.            Laboratory Data:   Lab results were searched in Care Everywhere and/or those brought by the pateint were reviewed today with Joce and he has a copy of their most recent labs to take home with them as notedbelow;       Imaging Data:   Imaging Data:       Assessment & Plan:       Impression:  1. Nocturia    2. Subacute maxillary sinusitis    3. Essential hypertension    4. Homocysteinemia ([address]    5. Erectile dysfunction due to arterial insufficiency    6. Pure hyperglyceridemia    7. Elevated lipids    8. Muscle weakness (generalized)    9. Leg swelling    10. Monocytosis    11. Meralgia paresthetica of right side    12. Other intestinal malabsorption    13. Blood glucose elevated    14. Low glucose level    15. Pain in both lower extremities    16.  Iron deficiency anemia due to chronic blood loss      Assessment and Plan: After reviewing the patients chief complaints, reviewing their labfindings in great detail (with the patient and those accompanying them) which correlate to their chief complaints, symptoms, and or medical conditions; suggestions were made relating to changes in diet and or supplementswhich may improve the complaints and which will be reflected in their future lab findings; Chief Complaint   Patient presents with    Follow-up    Gait Problem     cannot walk without ibuprofen, bilat knee, leg- calf area, behind knee    Other     when wakes up, shoulder joints painful   ;    Plans for the next visits:  - Abnormal and non-optimal Labs were ordered today to be repeated in the next 120-365 days to assess changes from adjustments in nutrition and or nutrients. - Patient instructed when having ablood draw to ask the  to divide their lab draws into multiple draws over several days if not feeling good at the time of the lab draw or if either prefers to do several smaller blood draws over several days  -Patient instructed to check with insurer before each lab draw and to to to the lab which the insurer directs them for the most cost effective lab draw with the least patient's cost  - Goitom  will be scheduled subsequentto those results. - Deonte Matta will bring in his drink and food log to his next visit    Chronic Problems Addressed on this Visit:                                   1.  Intensity of Service; Uncontrolled items at this visit; Chief Complaint   Patient presents with    Follow-up    Gait Problem     cannot walk without ibuprofen, bilat knee, leg- calf area, behind knee    Other     when wakes up, shoulder joints painful   ;              Improved items at this visit; Stable items atthis visit;  2.  Patients food and drinks were reviewed with the patient,       - Joce will bring food+drink symptom log to next visit for inclusion in their record - 75 better food list reviewed & given topatient with the omega 6 food list to avoid         - Gluten in corn and oats abstracts sheet reviewed and given to the patient today   3. Greater than 25minutes were spent face to face on this visit of which >50% was for counseling and coordination of care. Patients food and drinks were reviewed with thepatient,   - they will bring a food drink symptom log to future visits for inclusion in their record    - 75 better food list reviewed & given to patient along with the omega 6 food list to avoid      - Glutenin corn and oats abstracts sheet reviewed and given to the patient today    - 23 Foods containing Latex-like proteins was reviewed and copy to be taken if desired     - Nutrient Supplements list provided and copyto be taken if desired    - [employer] web site offered to patient to review at their convenience by staff with login information    Note:  I have discussed with the patient that with all nutraceuticals, there is often mixed data and emerging research which needs to be monitored; as well as an array of NIHfact sheets on nutrients and supplements. If I have recommended cinnamon at the request of this patient to assist them in control of their blood sugar, triglyceride and or weight issues. I discussed that thepatient's clinical use of cinnamon bark, calcium, magnesium, Vitamin D and pharmaceutical grade CVS #794912 fish oil or triple-strength fish oil, and B-75 two phase time-released B complex by Duglas Odell will be for atime-limited trial to determine their individual effectiveness and safety in this patient. I also referred the patient to the NMCD: Nutrition, Metabolism, and Cardiovascular Diseases (journal) and concerns about long-termuse and hepatotoxicity of cinnamon and other nutrients and suggest they frequently search nih.gov for the latest non-proprietary information on nutriceuticals as well as consider a subscription to [employer] fordetails on reviewed supplements, or at the least review the nutrient files at [address] at [facility], [employer], an insulin mimetic, reduces some High Carbohydrate Dietary Impacts. Methylhydroxychalcone polymers insulin-enhancing properties in fat cells are responsible for enhanced glucose uptake, inhibiting hepatic HMG-CoA reductase and lowers lipids. www.jacn. org/content/20/4/327.full     But cinnamon with additivessuch as Cinnamon Extract are not effective as insulin mimetics.  :eStoreDirectory.at     Nutrients for Start up from [employer] or [employer] for ease to get started now ;  Robbie Lerma has some useable products;  - Triple Strength Fish Oil, enteric coated  - Vit D 3 5000 IU gel caps  - Iron ferrous sulfat 325 mg tabs  - Centrum Silver look-a-like for most patients, or  - Centrum plain look-a-like if need iron    Localpharmacies or chains such as [employer], Walgreen, Wal-mart, have;  - Triple Strength Fish Oil (enteric coated ifavailable) or    If not enteric coated, can take from freezer for less burps - B-50 or B-100 released balanced B complex tabs  - Cinnamon bark 500 mg (without Chromium or extracts)   some brands list 1000 mg / serving of 2 capsules,    some brands have 1000 mg caps with the undesireable chromium / extract  - Calcium carbonate/citrate, magnesium oxide/citrate, Vit D 3  as 3-4 tabs/caps/serving     Some Local Brands may contain Zincwhich is acceptable for the first bottle or two  - Magnesium oxide 250 mg tabs for those having < 2 bowel movements daily  - Magnesium citrate 200 mg if having > 2bowel movement/day  - Centrum Silver or look-a-like for most patients, Centrum plain or look-a-like with iron  - Vitamin D-3 comes as 1,000 IU or 2,000 IU or 5,000 IU gel caps or Liquid drops      Some brands containing or derived from soy oil or corn oil are OK if not allergic to soy  - Elemental Iron 65 mg tabsat bedtime is available over the counter if need more iron     Usually turns bowel movements grey, green or black but not a concern  - Apricot Kernel Oil (by Now) for dry skin sensitive perineal or perianal area skin    Nutrients for ongoing use by Mail order for less expense from [employer] ;  - Strength Fish Oil , 240 Softgels Item #455355  -B-100 time released balanced B complex Item #530684  - Cinnamon bark 500 mg without Chromium or extract Item #975698  - Calcium carbonate 1000 mg, Magnesium oxide 500 mg, Vit D 3  400 IU Item #351628  - Magnesium oxide 500 mg tabs Item #346480 if less than 2 bowel movements daily  - [employer] Item #823439 for mostpatients, One Daily Item #452840 with iron  - Vit D 3  1,000 Item #257084      2,000 IU Item #699486  ,000 IU Item #788016     Some brands containing orderived from soy oil or corn oil are OK if not allergic to soy    Nutrients for Special Needs by [employer] for less expense from www. puritan.com ;  -Elemental Iron 65 mg tabs Item #977215 if need more iron for low iron on labs Usually turns bowel movements grey, green or black but not a concern  - Time released Niacin 250 mg Item #739821 for cold intolerance, low libido or impotence  - DHEA 50 mg Item #579100 for improving DHEA levels on labs if having Fatigue    If stools too loose substitute for your Magnesium oxide using;   Magnesium citrate 200 mg tabs(NOT liquid) at [employer]   Magnesium gluconate 550 mgby Juan at [employer] com or amazon. com  Magnesium chloride foot soaks or body sprays  www.Vacunek   Magnesium chloride flakes 14.99 Item #: TNV759 if Backordered get spray    Food Drink Symptom Log;  I asked this patient to track these items and any other symptoms on their list on a weekly basis to documenttheir progress or lack of same. This can be done on the symptom tracking sheet I gave them at today's visit but looks like this:                                                      Rate on scale of 0-10 with zero = notnoticeable  Symptom:                            Week 1               2                 3                 4               Etc            Hair loss    Foot cramps    Paresthesia    Aches    IBS (irritable bowel)    Constipation    Diarrhea  Nocturia    (up to bathroom at night)    Fatigue/Energy level  Stress      On the other side of the sheet they can track their food, drink, environment, activity, symptoms etc      Avoiding Latex-like proteins inmy foods; Avocados, Bananas, Celery, Figs & Kiwi proteins have latex-like proteins to inflame our immunesystems  How Can I Have A Latex Allergy? Eating foods with latex-like protein exposes us to latex allergies. Our body cannot tell the differencebetween these latex-like proteins and latex from rubber products since many people are allergic to fruit, vegetables and latex. Read labels on pre-packaged foods. This list to avoid is only a guide if you are known allergicto latex or have a latex rash on your chin, cheeks and lines on your neck and chest. The amount of latex is different in each food product or fruit variety. to Avoid out of Season if not grown locally: Melon, Nectarine, Papaya, Cherry, Passion fruit, Plum, Chestnuts, and Tomato. Avocado, Banana, Celery, Figs, and Kiwi always contain Latex-like protein. Whats in Season? Strawberries taste better in June than December because June is strawberry season so buy locally grown produce \"in season\" for the best flavor, cost and less Latex. Locally grown produce notonly tastes great requires little of no ethylene exposure in food distribution so has less latex content. Out of season, use canned, frozen or dried sinceprocessed ripe and are latex lower!!!   Month     [place] LocallyGrown Produce  January, February, March: use canned, frozen or dried fruits since lower in latex  April; asparagus, radishes  May; asparagus, broccoli, green onions, greens, peas, radishes,rhubarb  June; asparagus, beets, beans, broccoli, cabbage, cantaloupe, carrots, green onions, greens, lettuce,onions, parsley, peas, radishes, rhubarb, strawberries, watermelons  July; beans, beets, blueberries,broccoli, cabbage, cantaloupe, carrots, cauliflower, celery, cucumbers, eggplant, grapes, green onions, greens, lettuce, onions, parsley, peas, peaches, bell peppers, potatoes, radishes, summer raspberries, squash, sweetcorn, tomatoes, turnips, watermelons

## 2021-01-13 ENCOUNTER — TELEPHONE (OUTPATIENT)
Dept: FAMILY MEDICINE CLINIC | Age: 71
End: 2021-01-13

## 2021-01-13 DIAGNOSIS — M54.50 CHRONIC MIDLINE LOW BACK PAIN WITHOUT SCIATICA: ICD-10-CM

## 2021-01-13 DIAGNOSIS — G89.29 CHRONIC MIDLINE LOW BACK PAIN WITHOUT SCIATICA: ICD-10-CM

## 2021-01-13 DIAGNOSIS — M79.604 PAIN IN BOTH LOWER EXTREMITIES: ICD-10-CM

## 2021-01-13 DIAGNOSIS — M79.605 PAIN IN BOTH LOWER EXTREMITIES: ICD-10-CM

## 2021-01-26 ENCOUNTER — TELEPHONE (OUTPATIENT)
Dept: FAMILY MEDICINE CLINIC | Age: 71
End: 2021-01-26

## 2021-01-26 NOTE — LETTER
[address] [place] [address],[address] [facility]  Phone: [phone]  Fax: [phone]    Nora Gant MD        January 26, 2021    [place] [address]      Dear Dalton Suarez: This growth needs removed while it is still manageable as soon as possible to get a good outcome    If you have any questions or concerns, please don't hesitate to call.     Sincerely,        Nora Gant MD

## 2021-01-26 NOTE — TELEPHONE ENCOUNTER
Called him back, wanted to make sure he understood. He said he was prescribed Tramadol, iron and ferrous sulfate. Said he can get treated with these for the infection. 1.  Looked up ferrous sulfate- refused to take it, said it can make you crazy. Wants to know if there is a substitute    2. Only given 9 tablets of Tramadol, wants to know if she should continue taking. Makes him feel good. Doesn't take aspirin or ibuprofen anymore. Told him it is dangerous and that Dr. Yanni Garcia may not prescribe it. He wants to know if there is a subsitute. 3. Iron he is ok to take. Please advise.

## 2021-01-27 NOTE — TELEPHONE ENCOUNTER
1. He can eat liver weekly  2. He can ask his primary Dr Renan Gu or his partner Lucas President mD  3.  He can get a natural iron at the [employer]

## 2021-02-17 ENCOUNTER — TELEPHONE (OUTPATIENT)
Dept: FAMILY MEDICINE CLINIC | Age: 71
End: 2021-02-17

## 2021-02-17 NOTE — TELEPHONE ENCOUNTER
Pt was told by the pharmacy that CoQ10 is a type of muscle relaxer. He wants to know if he takes it are there any side effects that he should know about. Not sure with his condition, please advise.

## 2021-03-12 RX ORDER — HYDROCHLOROTHIAZIDE 25 MG/1
25 TABLET ORAL DAILY
Qty: 180 TABLET | Refills: 1 | Status: SHIPPED | OUTPATIENT
Start: 2021-03-12 | End: 2022-10-06 | Stop reason: SDUPTHER

## 2021-03-12 NOTE — TELEPHONE ENCOUNTER
Last visit- 12/17/2020  Next visit- 6/17/2021 with Dr. Aneudy Zamorano    Requested Prescriptions     Pending Prescriptions Disp Refills    hydroCHLOROthiazide (HYDRODIURIL) 25 MG tablet [Pharmacy Med Name: HYDROCHLOROTHIAZIDE 25 MG TAB] 180 tablet 1     Sig: TAKE 1 TABLET BY MOUTH DAILY AT BREAKFAST AND DINNER IF LEG OR FOOT SWOLLEN       Last potassium level:

## 2021-05-11 ENCOUNTER — TELEPHONE (OUTPATIENT)
Dept: FAMILY MEDICINE CLINIC | Age: 71
End: 2021-05-11

## 2021-05-11 RX ORDER — QUINIDINE GLUCONATE 324 MG
240 TABLET, EXTENDED RELEASE ORAL
Qty: 180 TABLET | Refills: 2 | Status: SHIPPED | OUTPATIENT
Start: 2021-05-11 | End: 2021-06-17 | Stop reason: SDUPTHER

## 2021-05-11 NOTE — TELEPHONE ENCOUNTER
Pt called wants to have Iron prescribed. Do not see it on his list.  He said he sent you a message, text? Wants it sent to [facility]?

## 2021-05-25 ENCOUNTER — TELEPHONE (OUTPATIENT)
Dept: FAMILY MEDICINE CLINIC | Age: 71
End: 2021-05-25

## 2021-05-25 NOTE — TELEPHONE ENCOUNTER
Pt called. Jamie Wolfe he wants a physical and for you to check his left shoulder. Swelling. But both shoulders have pain. Appointment is 6/17/21.

## 2021-06-17 ENCOUNTER — OFFICE VISIT (OUTPATIENT)
Dept: FAMILY MEDICINE CLINIC | Age: 71
End: 2021-06-17
Payer: MEDICARE

## 2021-06-17 ENCOUNTER — TELEPHONE (OUTPATIENT)
Dept: FAMILY MEDICINE CLINIC | Age: 71
End: 2021-06-17

## 2021-06-17 VITALS
TEMPERATURE: 98.6 F | HEART RATE: 72 BPM | RESPIRATION RATE: 12 BRPM | WEIGHT: 172.4 LBS | DIASTOLIC BLOOD PRESSURE: 82 MMHG | OXYGEN SATURATION: 99 % | HEIGHT: 68 IN | SYSTOLIC BLOOD PRESSURE: 132 MMHG | BODY MASS INDEX: 26.13 KG/M2

## 2021-06-17 DIAGNOSIS — R76.11 NONSPECIFIC REACTION TO TUBERCULIN SKIN TEST WITHOUT ACTIVE TUBERCULOSIS: ICD-10-CM

## 2021-06-17 DIAGNOSIS — R68.2 DRY MOUTH: ICD-10-CM

## 2021-06-17 DIAGNOSIS — R07.89 OTHER CHEST PAIN: ICD-10-CM

## 2021-06-17 DIAGNOSIS — E78.1 PURE HYPERGLYCERIDEMIA: ICD-10-CM

## 2021-06-17 DIAGNOSIS — R35.1 NOCTURIA: ICD-10-CM

## 2021-06-17 DIAGNOSIS — E86.0 DEHYDRATION: ICD-10-CM

## 2021-06-17 DIAGNOSIS — N52.01 ERECTILE DYSFUNCTION DUE TO ARTERIAL INSUFFICIENCY: ICD-10-CM

## 2021-06-17 DIAGNOSIS — K90.89 OTHER INTESTINAL MALABSORPTION: ICD-10-CM

## 2021-06-17 DIAGNOSIS — G56.90: ICD-10-CM

## 2021-06-17 DIAGNOSIS — R05.9 COUGH: ICD-10-CM

## 2021-06-17 DIAGNOSIS — R58 HEMORRHAGE: ICD-10-CM

## 2021-06-17 DIAGNOSIS — E78.5 ELEVATED LIPIDS: ICD-10-CM

## 2021-06-17 DIAGNOSIS — E55.9 VITAMIN D DEFICIENCY: ICD-10-CM

## 2021-06-17 DIAGNOSIS — M79.606 PAIN OF LOWER EXTREMITY, UNSPECIFIED LATERALITY: ICD-10-CM

## 2021-06-17 DIAGNOSIS — J01.00 SUBACUTE MAXILLARY SINUSITIS: ICD-10-CM

## 2021-06-17 DIAGNOSIS — R04.2 HEMOPTYSIS: ICD-10-CM

## 2021-06-17 DIAGNOSIS — D72.821 MONOCYTOSIS: ICD-10-CM

## 2021-06-17 DIAGNOSIS — E78.5 LIPIDS BLOOD INCREASED: ICD-10-CM

## 2021-06-17 DIAGNOSIS — R79.83 HOMOCYSTEINEMIA: ICD-10-CM

## 2021-06-17 DIAGNOSIS — R20.9 DISTURBANCE OF SKIN SENSATION: ICD-10-CM

## 2021-06-17 DIAGNOSIS — R94.5 NONSPECIFIC ABNORMAL RESULTS OF LIVER FUNCTION STUDY: ICD-10-CM

## 2021-06-17 DIAGNOSIS — I10 ESSENTIAL HYPERTENSION: ICD-10-CM

## 2021-06-17 DIAGNOSIS — M62.81 MUSCLE WEAKNESS (GENERALIZED): ICD-10-CM

## 2021-06-17 DIAGNOSIS — G89.29 CHRONIC MIDLINE LOW BACK PAIN WITHOUT SCIATICA: ICD-10-CM

## 2021-06-17 DIAGNOSIS — M79.605 PAIN IN BOTH LOWER EXTREMITIES: ICD-10-CM

## 2021-06-17 DIAGNOSIS — M79.604 PAIN IN BOTH LOWER EXTREMITIES: ICD-10-CM

## 2021-06-17 DIAGNOSIS — R10.11 ABDOMINAL PAIN, RIGHT UPPER QUADRANT: ICD-10-CM

## 2021-06-17 DIAGNOSIS — R21 RASH AND OTHER NONSPECIFIC SKIN ERUPTION: ICD-10-CM

## 2021-06-17 DIAGNOSIS — M54.50 CHRONIC MIDLINE LOW BACK PAIN WITHOUT SCIATICA: ICD-10-CM

## 2021-06-17 DIAGNOSIS — G57.11 MERALGIA PARESTHETICA OF RIGHT SIDE: ICD-10-CM

## 2021-06-17 DIAGNOSIS — M79.89 LEG SWELLING: ICD-10-CM

## 2021-06-17 DIAGNOSIS — C84.00 MYCOSIS FUNGOIDES, UNSPECIFIED BODY REGION (HCC): Primary | ICD-10-CM

## 2021-06-17 DIAGNOSIS — R79.9 ABNORMAL FINDING OF BLOOD CHEMISTRY: ICD-10-CM

## 2021-06-17 PROCEDURE — G8427 DOCREV CUR MEDS BY ELIG CLIN: HCPCS | Performed by: FAMILY MEDICINE

## 2021-06-17 PROCEDURE — G8417 CALC BMI ABV UP PARAM F/U: HCPCS | Performed by: FAMILY MEDICINE

## 2021-06-17 PROCEDURE — 99214 OFFICE O/P EST MOD 30 MIN: CPT | Performed by: FAMILY MEDICINE

## 2021-06-17 PROCEDURE — 1123F ACP DISCUSS/DSCN MKR DOCD: CPT | Performed by: FAMILY MEDICINE

## 2021-06-17 PROCEDURE — 1036F TOBACCO NON-USER: CPT | Performed by: FAMILY MEDICINE

## 2021-06-17 PROCEDURE — 4040F PNEUMOC VAC/ADMIN/RCVD: CPT | Performed by: FAMILY MEDICINE

## 2021-06-17 PROCEDURE — 3017F COLORECTAL CA SCREEN DOC REV: CPT | Performed by: FAMILY MEDICINE

## 2021-06-17 RX ORDER — QUINIDINE GLUCONATE 324 MG
240 TABLET, EXTENDED RELEASE ORAL
Qty: 180 TABLET | Refills: 2 | Status: SHIPPED | OUTPATIENT
Start: 2021-06-17 | End: 2022-10-06

## 2021-06-17 RX ORDER — MELATONIN
4000 DAILY
Qty: 120 TABLET | Refills: 6 | Status: SHIPPED | OUTPATIENT
Start: 2021-06-17 | End: 2022-04-21

## 2021-06-17 RX ORDER — B-COMPLEX WITH VITAMIN C
1 TABLET ORAL
Qty: 120 TABLET | Refills: 5 | Status: SHIPPED | OUTPATIENT
Start: 2021-06-17

## 2021-06-17 RX ORDER — UBIDECARENONE 75 MG
1 CAPSULE ORAL
Qty: 120 TABLET | Refills: 5 | Status: SHIPPED | OUTPATIENT
Start: 2021-06-17 | End: 2022-04-21 | Stop reason: SDUPTHER

## 2021-06-17 RX ORDER — MAGNESIUM OXIDE/MAG AA CHELATE 133 MG
1 TABLET ORAL 3 TIMES DAILY
Qty: 90 TABLET | Refills: 6 | Status: SHIPPED | OUTPATIENT
Start: 2021-06-17 | End: 2022-04-21 | Stop reason: SDUPTHER

## 2021-06-17 RX ORDER — ACETAMINOPHEN 500 MG
500 TABLET ORAL 2 TIMES DAILY
COMMUNITY

## 2021-06-17 RX ORDER — PENICILLIN V POTASSIUM 500 MG/1
TABLET ORAL
Qty: 100 TABLET | Refills: 3 | Status: SHIPPED | OUTPATIENT
Start: 2021-06-17 | End: 2022-04-21 | Stop reason: SDUPTHER

## 2021-06-17 RX ORDER — CHLORAL HYDRATE 500 MG
3000 CAPSULE ORAL 3 TIMES DAILY
Qty: 90 CAPSULE | Refills: 5 | Status: SHIPPED | OUTPATIENT
Start: 2021-06-17

## 2021-06-17 ASSESSMENT — PATIENT HEALTH QUESTIONNAIRE - PHQ9
2. FEELING DOWN, DEPRESSED OR HOPELESS: 0
SUM OF ALL RESPONSES TO PHQ QUESTIONS 1-9: 0
SUM OF ALL RESPONSES TO PHQ QUESTIONS 1-9: 0
SUM OF ALL RESPONSES TO PHQ9 QUESTIONS 1 & 2: 0
1. LITTLE INTEREST OR PLEASURE IN DOING THINGS: 0
SUM OF ALL RESPONSES TO PHQ QUESTIONS 1-9: 0

## 2021-06-17 NOTE — TELEPHONE ENCOUNTER
guilhermed a need for clarification from [facility] in [place]. I called and left a message that yes the script is correct. Penicilln  mg  Disp:  100 tab  1 tab tid    Patient is instructed on how to take. If any questions call back.

## 2021-06-17 NOTE — PROGRESS NOTES
[id number] [facility] [address] [address]  Dept: [phone]  Dept Fax: [phone]  Loc: [phone]      Basim Rivas is a 66 y.o. Black male. Lina Barrera  presents to the [place] [address] clinic today for   Chief Complaint   Patient presents with    6 Month Follow-Up     wants Physical    Shoulder Pain     BILAT, BUT LEFT IS SWOLLEN- hard lump    Other     pain in shoulders in morning, extends to index fingers   , and;   No diagnosis found. I have reviewed [facility] medical, surgical and other pertinent history in detail, and have updated medication and allergy information in the computerized patient record. Clinical Care Team:     -Referring Provider for today's consult: self  -Primary Care Provider: Yvette Peters MD    Medical/Surgical History:   He  has a past medical history of Anorgasmia, Chest pain, Hemoptysis, Hemorrhoids, internal, with bleeding, Hypertension, LBP (low back pain), LFTs abnormal, Neuropathy, Paresthesia of left arm, and Pure hyperglyceridemia. His  has no past surgical history on file. Family/Social History:     His family history includes High Blood Pressure in his father; Stroke in his father. He  reports that he has never smoked. He has never used smokeless tobacco. He reports that he does not drink alcohol and does not use drugs.     Medications/Allergies/Immunizations:     His current medication(s) include   Current Outpatient Medications:     acetaminophen (TYLENOL) 500 MG tablet, Take 500 mg by mouth 2 times daily, Disp: , Rfl:     ferrous gluconate (FERGON) 240 (27 Fe) MG tablet, Take 1 tablet by mouth 3 times daily (with meals), Disp: 180 tablet, Rfl: 2    vitamin D3 (CHOLECALCIFEROL) 25 MCG (1000 UT) TABS tablet, Take 4 tablets by mouth daily, Disp: 120 tablet, Rfl: 6    Omega-3 Fatty Acids (FISH OIL) 1000 MG CAPS, Take 3 capsules by mouth 3 times daily CVS pharmaceutical grade, Disp: 90 capsule, Rfl: 5    B Complex-Biotin-FA (B-100 TR) TBCR, Take 1 tablet by mouth 4 times daily (before meals and nightly) CVS B-100 Time released balanced B complex tablet, Disp: 120 tablet, Rfl: 5    Specialty Vitamins Products (MAGNESIUM, AMINO ACID CHELATE,) 133 MG tablet, Take 1 tablet by mouth 3 times daily, Disp: 90 tablet, Rfl: 6    penicillin v potassium (VEETID) 500 MG tablet, TAKE 1 TABLET BY MOUTH 3 TIMES DAILY (Patient taking differently: TAKE 1 TABLET BY MOUTH 2 TIMES DAILY), Disp: 100 tablet, Rfl: 3    Cyanocobalamin (VITAMIN B 12 PO), Take 2 each by mouth daily , Disp: , Rfl:     hydroCHLOROthiazide (HYDRODIURIL) 25 MG tablet, TAKE 1 TABLET BY MOUTH DAILY AT BREAKFAST AND DINNER IF LEG OR FOOT SWOLLEN (Patient not taking: Reported on 6/17/2021), Disp: 180 tablet, Rfl: 1    Multiple Minerals-Vitamins (DINAH-MAG-ZINC-D) TABS, Take 1 tablet by mouth 4 times daily (before meals and nightly) (Patient not taking: Reported on 6/17/2021), Disp: 120 tablet, Rfl: 5    sildenafil (VIAGRA) 100 MG tablet, TAKE 1 TABLET BY MOUTH APPROXIMATELY 1 HOUR BEFORE SEXUAL ACTIVITY. DO NOT USE MORE THAN 1 DOSE DAILY (Patient not taking: Reported on 12/17/2020), Disp: 2 tablet, Rfl: 0    potassium bicarbonate (EFFER-K) 25 MEQ disintegrating tablet, Take 1 tablet by mouth 2 times daily (Patient not taking: Reported on 6/17/2020), Disp: 180 tablet, Rfl: 3  Allergies: Patient has no known allergies.   Immunizations:   Immunization History   Administered Date(s) Administered    COVID-19, Moderna, PF, 100mcg/0.5mL 01/15/2021, 02/12/2021    Influenza A ([id number]) Vaccine PF IM 12/15/2009    Influenza Vaccine, unspecified formulation 11/21/2013, 11/29/2017    Influenza Virus Vaccine 11/21/2013, 11/12/2014, 11/24/2015, 11/29/2017    Influenza Whole 09/28/2010    Influenza, Quadv, adjuvanted, 65 yrs +, IM, PF (Fluad) 09/26/2020    PPD Test 05/16/2011        History of Present Illness: in diet and or supplements which may improve the complaints and which will be reflected in their future lab findings; Chief Complaint   Patient presents with    6 Month Follow-Up     wants Physical    Shoulder Pain     BILAT, BUT LEFT IS SWOLLEN- hard lump    Other     pain in shoulders in morning, extends to index fingers   ;    Plans for the next visits:  - Abnormal and non-optimal Labs were ordered today to be repeated in the next 120-365 days to assess changes from adjustments in nutrition and or nutrients. - Patient instructed when having a blood draw to ask the  to divide their lab draws into multiple draws over several days if not feeling good at the time of the lab draw or if either prefers to do several smaller blood draws over several days  -Patient instructed to check with insurer before each lab draw and to go to the lab which the insurer directs them for the most cost effective lab draw with the least patient's cost  - [employer]  will be scheduled subsequent to those results. - Natacha Ventura will bring in his drink, food, supplement log to his next visit    Chronic Problems Addressed on this Visit:                                   1.  Intensity of Service; Uncontrolled items at this visit; Chief Complaint   Patient presents with    6 Month Follow-Up     wants Physical    Shoulder Pain     BILAT, BUT LEFT IS SWOLLEN- hard lump    Other     pain in shoulders in morning, extends to index fingers   ; Improved items at this visit and Stable items were discussed at this visit;  2.  Patients food, drinks, supplements and symptoms were reviewed with the patient,       - [employer] will bring food, drink, supplements and symptoms log to next visit for inclusion in their record      - 75 better food list reviewed & given to patient with the omega 6 food list to avoid      - The 52 Latex foods list was reviewed and given to the patients with the information on carrageenan         - Gluten in corn and oats abstracts sheet reviewed and given to the patient today   3. Greater than 20 minutes time was spent with the patient face to face on this visit; of which >50% was for counseling and coordination of care, as well as the time spent before and after the visit reviewing the chart, documenting the encounter, reviewing labs,reports, NIH listed studies, making phone calls, etc.      Patients food and drinks were reviewed with the patient,   - They will bring a food drink symptom log to future visits for inclusion in their record    - 75 better food list reviewed & given to patient along with the omega 6 food list to avoid      - Gluten in corn and oats abstracts sheet reviewed and given to the patient today    - 23 Foods containing Latex-like proteins was reviewed and copy to be taken if desired     - Nutrient Supplements list provided and copyto be taken if desired    - [id number]. [employer] web site offered to patient to review at their convenience by staff with login information    Note:  I have discussed with the patient that with all nutraceuticals, there is often mixed data and emerging research which needs to be monitored; as well as an array of NIH fact sheets on nutrients and supplements, available at www.nih,issue plus YuuConnect. com plus www.lpi,org. If I have recommended cinnamon at the request of this patient to assist them in control of their blood sugar, triglyceride, and/or weight issues. I discussed that the patient's clinical use of cinnamon bark, calcium, magnesium, Vitamin D, and pharmaceutical grade CVS omega 3 oil or triple-strength fish oil, and B-50/B-100 time-released B-complex by [address] [facility] will be for a time-limited trial to determine their individual effectiveness and safety in this patient.   I also referred the patient to the NMCD: Nutrition, Metabolism, and Cardiovascular Diseases (SecuritiesCard.pl) and concerns about long-term use and hepatotoxicity of cinnamon and other nutrients. I suggested they frequently search nih.gov for the latest non-proprietary information on nutriceuticals as well as consider a subscription to [employer] for details on reviewed supplements, or at the least review the nutrient files at [facility] at [facility], [address] bark, an insulin mimetic, reduces some High Carbohydrate Dietary Impacts. Methylhydroxychalcone polymers insulin-enhancing properties in fat cells are responsible for enhanced glucose uptake, inhibiting hepatic HMG-CoA reductase and lowers lipids. www.jacn. org/content/20/4/327.full     But cinnamon with additives such as Cinnamon Extract are not effective as insulin mimetics.  :eStoreDirectory.at     Nutrients for Start up from [employer] or [employer] for ease to get started now;  oRbbie Lerma has some useable products;  - Triple Strength Fish Oil, enteric coated  - Vit D-3 5000 IU gel caps  - Iron ferrous sulfate 325 mg tabs  - Centrum Silver look-a-like for most patients, or  - Centrum plain look-a-like if need iron    Local pharmacies or chains such as [facility], have:  - 3ROAM pharmaceutical grade omega 3 is 90% EPA/DHA whereas most Triple strength fish oil are 75% EPA/DHA  - Triple Strength Fish Oil (enteric coated if available) or if not enteric coated, can take from freezer for less burps  - B-50 or B-100 released balanced B complex tabs by [address] [facility] [place] [facility] bark 500 mg (without Chromium or extracts)   some brands list 1000 mg / serving of 2 capsules,    some brands have 1000 mg caps with the undesireable chromium extract  - Calcium carbonate/citrate, magnesium oxide/citrate, Vit D-3 as 3-4 tabs/caps/serving     Some Local Brands may contain Zinc which is acceptable for the first bottle or two  - Magnesium oxide 250 mg tabs for those having < 2 bowel movements daily  - Magnesium citrate 200 mg if having > 2 bowel movements/day  - Centrum Silver or look-a-like for most patients, Centrum plain or look-a-like with iron  - Vitamin D-3 comes as 1,000 IU or 2,000 IU or 5,000 IU gel caps or Liquid drops but keep Vitamin D levels <50 but >40     Some brands containing or derived from soy oil or corn oil are OK if not allergic to soy  - Elemental Iron 65 mg tabs at bedtime is available over the counter if need more iron     Usually turns bowel movements grey, green, or black but not a concern  - Apricot Kernel Oil (by Now) for dry skin sensitive perineal or perianal area skin    Nutrients for ongoing use by Mail order for less expense from www. BULX ;  - Strength Fish Oil , 240 Softgels Item #633427  -B-100 time released balanced B complex Item #173264  - Cinnamon bark 500 mg without Chromium or extract Item #320774  - Calcium carbonate 1000 mg, Magnesium oxide 500 mg, Vit D-3 400 IU Item #535021  - Magnesium oxide 500 mg tabs Item #804801 if less than 2 bowel movements daily  - [employer] Seniors Item #142689 for most patients, One Daily Item #824410 with iron  - Vit D 3  1,000 Item #627432      2,000 IU Item #719363   Item #906671     Some brands containing orderived from soy oil or corn oil are OK if not allergic to soy    Nutrients for Special Needs by Mail order for less expense from www. puritan.com;  -Elemental Iron 65 mg tabs Item #273914 if need more iron for low iron on labs    Usually turns bowel movements grey, green or black but not a concern  - Time released Niacin 250 mg Item #837979 for cold intolerance, low libido or impotence  - DHEA 50 mg Item #473245 for improving DHEA levels on labs if having Fatigue    If stools too loose substitute for your Magnesium oxide using;   Magnesium citrate 200 mg tabs (NOT liquid) at [employer]ir[employer]   Magnesium gluconate 550 mg by Arlene Shells at [employer] or Kähu. com  Magnesium chloride foot soaks or body sprays  www.[employer]   Magnesium chloride flakes 14.99 Latex. Locally grown produce not only tastes great but also requires little or no ethylene exposure in food distribution so has less latex content. Out of season: use canned, frozen, or dried since those are processed ripe and latex content is lower!!!     Month     [place] Locally Grown Produce  January, February, March: use canned, frozen or dried fruits since lower in latex  April: asparagus, radishes  May: asparagus, broccoli, green onions, greens, peas, radishes, rhubarb  Jennie: asparagus, beets, beans, broccoli, cabbage, cantaloupe, carrots, green onions, greens, lettuce, onions, parsley, peas, radishes, rhubarb, strawberries, watermelons  July: beans, beets, blueberries, broccoli, cabbage, cantaloupe, carrots, cauliflower, celery, cucumbers, eggplant, grapes, green onions, greens, lettuce, onions, parsley, peas, peaches, bell peppers, potatoes, radishes, summer raspberries, squash, sweetcorn, tomatoes, turnips, watermelons  August: apples, beans, beets, blueberries, cabbage, cantaloupe, carrots, cauliflower, celery, cucumbers, eggplant, grapes, green onions, greens, lettuce, onions, parsley, peas, peaches, pears, bell peppers, potatoes, radishes, squash, sweet corn, tomatoes, turnips, watermelons  September: apples, beans, beets, blueberries, cabbage, cantaloupe, carrots, cauliflower, celery, cucumbers, eggplant, grapes, green onions, greens, lettuce, onions, parsley, peas, peaches, pears, bell peppers, plums, potatoes, pumpkins, radishes, fall red raspberries, squash, sweet corn, tomatoes, turnips, watermelons  October: apples, beets, broccoli, cabbage, carrots, cauliflower, celery, green onions, greens, lettuce, parsley, peas, pears, potatoes, pumpkins, radishes, fall red raspberries, squash, turnips  November: broccoli, cabbage, carrots, parsley, pears, peas  December: use canned, frozen or dried fruits since lower in latex    Upto half of latex-sensitive patients show allergic reactions to fruits (avocados, bananas, kiwifruits, papayas, peaches),   Annals of Allergy, 1994. These plants contain the same proteins that are allergens in latex. People with fruit allergies should warn physicians before undergoing procedures which may cause anaphylactic reaction if in contact with latex gloves. Some of the common foods with defined cross-reactivity to latex are avocado, banana, kiwi, chestnut, raw potato, tomato, stone fruits (e.g., peach, cherry), hazelnut, melons, celery, carrot, apple, pear, papaya, and almond. Foods with less well-defined cross-reactivity to latex are peanuts, peppers, citrus fruits, coconut, pineapple, thom, fig, passion fruit, Ugli fruit, and grape. This fruit/latex cross-reactivity is worsened by ethylene, a gas used to hasten commercial ripening. In nature, plants produce low levels of the hormone ethylene, which regulates germination, flowering, and ripening. Forced ripening by high ethylene concentrations, plants produce allergenic wound-repair proteins, which are similar to wound-repair proteins made during the tapping of rubber trees. Sensitive individuals who ingest the fruit get a higher dose and worse reaction. Some people may even first become sensitized to latex through fruit. Can food processing increase the concentrations of allergenic proteins? Latex-sensitized children (and adults) in [place] often experience allergic reactions after eating bananas ripened artificially with ethylene. In the [place], food distribution centers treat unripe bananas and other produce with ethylene to ripen; not commonly done in [facility] since fruit is tree-ripened there. Does treatment of food with ethylene induce banana proteins that cross-react with latex?  (Nic et al.)    References:   Latex in Foods Allergy, http://ehp.niehs.nih.gov/members/2003/5811/5811.html    Search web for [employer] in Season \" for where you live or are at the time you food shop   Management of Latex, ://medicalcenter. [facility].edu/  search for nih, latex-like proteins in foods

## 2021-06-17 NOTE — PATIENT INSTRUCTIONS
Thank you   1. Thank you for trusting us with your healthcare needs. You may receive a survey regarding today's visit. It would help us out if you would take a few moments to provide your feedback. We value your input. 2. Please bring in ALL medications BOTTLES, including inhalers, herbal supplements, over the counter, prescribed & non-prescribed medicine. The office would like actual medication bottles and a list.   3. Please note our OFFICE POLICIES:   a. Prior to getting your labs drawn, please check with your insurance company for benefits and eligibility of lab services. Often, insurance companies cover certain tests for preventative visits only. It is patient's responsibility to see what is covered. b. We are unable to change a diagnosis after the test has been performed. c. Lab orders will not be re-printed. Please hold onto your original lab orders and take them to your lab to be completed. d. If you no show your scheduled appointment three times, you will be dismissed from this practice. e. [place] must be completed 24 hours prior to your schedule appointment. 4. If the list below has been completed, PLEASE FAX RECORDS TO OUR OFFICE @ [phone].  Once the records have been received we will update your records at our office:  Health Maintenance Due   Topic Date Due    DTaP/Tdap/Td vaccine (1 - Tdap) Never done    Shingles Vaccine (1 of 2) Never done    Pneumococcal 65+ yrs at Risk Vaccine (1 of 2 - PCV13) Never done   [employer] Visit (AWV)  Never done    A1C test (Diabetic or Prediabetic)  06/17/2021    Potassium monitoring  06/17/2021    Creatinine monitoring  06/17/2021    PSA counseling  06/17/2021

## 2021-08-11 ENCOUNTER — TELEPHONE (OUTPATIENT)
Dept: FAMILY MEDICINE CLINIC | Age: 71
End: 2021-08-11

## 2021-08-11 NOTE — TELEPHONE ENCOUNTER
Iris Roche called and states that he has been having numbness to the touch over body starting yesterday and would like to know your thoughts on the cause of this. Iris maufait states that he is going to call back at 1230 today for the answer. Please Advise.

## 2021-08-11 NOTE — TELEPHONE ENCOUNTER
Thanks  Numbness comes when stopped taking the [address]908 Droplr B-100 before meals since it contains essential Bs not in the B12, he needs both

## 2021-08-11 NOTE — TELEPHONE ENCOUNTER
MG Plus Protein 133 mg taking 3 daily  HCTZ 25 MG DAILY  Tylenol TID  Fish Oil 1000 mg TID   Vit B12 daily  Calcium chew daily  Stopped Iron due lip swelling  Stopped Vit B Complex once started B12 and would like to know if he should stay off B Complex  on B12  Stopped Vit D so taking in food and would like to know if he should continue this. Concerned if this numbness is a serious condition or nervous system problem. Niki Ciarra states that he wantd you to know that he has good urine flow and bowel movements, good energy, good appetite, and fatigue in bilateral shoulders in the morning and after exercise for 30 min fatigue in shoulders is gone. Please Advise.

## 2021-08-30 DIAGNOSIS — N52.01 ERECTILE DYSFUNCTION DUE TO ARTERIAL INSUFFICIENCY: ICD-10-CM

## 2021-08-30 RX ORDER — SILDENAFIL 100 MG/1
TABLET, FILM COATED ORAL
Qty: 2 TABLET | Refills: 3 | Status: SHIPPED | OUTPATIENT
Start: 2021-08-30 | End: 2021-09-01 | Stop reason: CLARIF

## 2021-08-30 NOTE — TELEPHONE ENCOUNTER
Last visit- 6/17/2021  Next visit- 12/16/2021    Requested Prescriptions     Pending Prescriptions Disp Refills    sildenafil (VIAGRA) 100 MG tablet 2 tablet 3     Sig: TAKE 1 TABLET BY MOUTH APPROXIMATELY 1 HOUR BEFORE SEXUAL ACTIVITY. DO NOT USE MORE THAN 1 DOSE DAILY       Pt called and asked for refill.   Please review, approve or deny

## 2021-09-01 RX ORDER — SILDENAFIL 100 MG/1
TABLET, FILM COATED ORAL
Qty: 2 TABLET | Refills: 3 | Status: SHIPPED | OUTPATIENT
Start: 2021-09-01

## 2021-09-01 NOTE — TELEPHONE ENCOUNTER
Pt called after hours. He left a message at 4:48 yesterday. He said [facility] did not get the script. He was correct, we sent it to [facility] instead. I resent it to [facility]. Called pt, phone is busy. Called [facility] and advised I sent to wrong location. They said the only one they have in inactive. Told her that is fine.

## 2021-12-06 ENCOUNTER — TELEPHONE (OUTPATIENT)
Dept: FAMILY MEDICINE CLINIC | Age: 71
End: 2021-12-06

## 2021-12-07 ENCOUNTER — TELEPHONE (OUTPATIENT)
Dept: FAMILY MEDICINE CLINIC | Age: 71
End: 2021-12-07

## 2021-12-07 NOTE — TELEPHONE ENCOUNTER
Called and lm for him to go to the Emergency room. His cbc came back and his hgb is 6.7 which is very low. Strongly suggest that he get checked out. He may call us back, but he needs checked.

## 2021-12-16 ENCOUNTER — NURSE ONLY (OUTPATIENT)
Dept: LAB | Age: 71
End: 2021-12-16

## 2021-12-16 ENCOUNTER — OFFICE VISIT (OUTPATIENT)
Dept: FAMILY MEDICINE CLINIC | Age: 71
End: 2021-12-16
Payer: MEDICARE

## 2021-12-16 VITALS
WEIGHT: 178.4 LBS | HEIGHT: 68 IN | DIASTOLIC BLOOD PRESSURE: 84 MMHG | BODY MASS INDEX: 27.04 KG/M2 | TEMPERATURE: 97.1 F | RESPIRATION RATE: 12 BRPM | OXYGEN SATURATION: 99 % | HEART RATE: 81 BPM | SYSTOLIC BLOOD PRESSURE: 136 MMHG

## 2021-12-16 DIAGNOSIS — R07.89 OTHER CHEST PAIN: ICD-10-CM

## 2021-12-16 DIAGNOSIS — E78.5 ELEVATED LIPIDS: ICD-10-CM

## 2021-12-16 DIAGNOSIS — M62.81 MUSCLE WEAKNESS (GENERALIZED): ICD-10-CM

## 2021-12-16 DIAGNOSIS — N52.01 ERECTILE DYSFUNCTION DUE TO ARTERIAL INSUFFICIENCY: Primary | ICD-10-CM

## 2021-12-16 DIAGNOSIS — E86.0 DEHYDRATION: ICD-10-CM

## 2021-12-16 DIAGNOSIS — I10 ESSENTIAL HYPERTENSION: ICD-10-CM

## 2021-12-16 DIAGNOSIS — D72.821 MONOCYTOSIS: ICD-10-CM

## 2021-12-16 DIAGNOSIS — R35.1 NOCTURIA: ICD-10-CM

## 2021-12-16 DIAGNOSIS — R79.83 HOMOCYSTEINEMIA: ICD-10-CM

## 2021-12-16 DIAGNOSIS — E78.1 PURE HYPERGLYCERIDEMIA: ICD-10-CM

## 2021-12-16 DIAGNOSIS — E78.5 LIPIDS BLOOD INCREASED: ICD-10-CM

## 2021-12-16 DIAGNOSIS — R79.82 CRP ELEVATED: ICD-10-CM

## 2021-12-16 DIAGNOSIS — R70.0 ELEVATED SED RATE: ICD-10-CM

## 2021-12-16 DIAGNOSIS — N52.01 ERECTILE DYSFUNCTION DUE TO ARTERIAL INSUFFICIENCY: ICD-10-CM

## 2021-12-16 DIAGNOSIS — M89.8X8 BONY PELVIC PAIN: ICD-10-CM

## 2021-12-16 DIAGNOSIS — E55.9 VITAMIN D DEFICIENCY: ICD-10-CM

## 2021-12-16 DIAGNOSIS — K90.89 OTHER INTESTINAL MALABSORPTION: ICD-10-CM

## 2021-12-16 PROBLEM — R22.0 LEFT FACIAL SWELLING: Status: ACTIVE | Noted: 2019-05-06

## 2021-12-16 PROBLEM — N40.0 BPH WITHOUT OBSTRUCTION/LOWER URINARY TRACT SYMPTOMS: Status: ACTIVE | Noted: 2020-07-09

## 2021-12-16 PROBLEM — K11.20 PAROTIDITIS: Status: ACTIVE | Noted: 2019-05-07

## 2021-12-16 PROBLEM — D64.9 ANEMIA: Status: ACTIVE | Noted: 2021-01-20

## 2021-12-16 PROBLEM — T78.40XA ALLERGIC REACTION: Status: ACTIVE | Noted: 2019-05-06

## 2021-12-16 LAB
BILIRUBIN URINE: NEGATIVE
BLOOD, URINE: NEGATIVE
CHARACTER, URINE: CLEAR
COLOR: YELLOW
GLUCOSE URINE: NEGATIVE MG/DL
KETONES, URINE: NEGATIVE
LEUKOCYTE ESTERASE, URINE: NEGATIVE
NITRITE, URINE: NEGATIVE
PH UA: 7 (ref 5–9)
PROTEIN UA: NEGATIVE
SPECIFIC GRAVITY, URINE: 1.02 (ref 1–1.03)
UROBILINOGEN, URINE: 0.2 EU/DL (ref 0–1)

## 2021-12-16 PROCEDURE — 4040F PNEUMOC VAC/ADMIN/RCVD: CPT | Performed by: FAMILY MEDICINE

## 2021-12-16 PROCEDURE — 99215 OFFICE O/P EST HI 40 MIN: CPT | Performed by: FAMILY MEDICINE

## 2021-12-16 PROCEDURE — 1036F TOBACCO NON-USER: CPT | Performed by: FAMILY MEDICINE

## 2021-12-16 PROCEDURE — 3017F COLORECTAL CA SCREEN DOC REV: CPT | Performed by: FAMILY MEDICINE

## 2021-12-16 PROCEDURE — G8427 DOCREV CUR MEDS BY ELIG CLIN: HCPCS | Performed by: FAMILY MEDICINE

## 2021-12-16 PROCEDURE — 1123F ACP DISCUSS/DSCN MKR DOCD: CPT | Performed by: FAMILY MEDICINE

## 2021-12-16 PROCEDURE — G8417 CALC BMI ABV UP PARAM F/U: HCPCS | Performed by: FAMILY MEDICINE

## 2021-12-16 PROCEDURE — G8484 FLU IMMUNIZE NO ADMIN: HCPCS | Performed by: FAMILY MEDICINE

## 2021-12-16 SDOH — ECONOMIC STABILITY: FOOD INSECURITY: WITHIN THE PAST 12 MONTHS, YOU WORRIED THAT YOUR FOOD WOULD RUN OUT BEFORE YOU GOT MONEY TO BUY MORE.: NEVER TRUE

## 2021-12-16 SDOH — ECONOMIC STABILITY: FOOD INSECURITY: WITHIN THE PAST 12 MONTHS, THE FOOD YOU BOUGHT JUST DIDN'T LAST AND YOU DIDN'T HAVE MONEY TO GET MORE.: NEVER TRUE

## 2021-12-16 ASSESSMENT — SOCIAL DETERMINANTS OF HEALTH (SDOH): HOW HARD IS IT FOR YOU TO PAY FOR THE VERY BASICS LIKE FOOD, HOUSING, MEDICAL CARE, AND HEATING?: NOT VERY HARD

## 2021-12-16 NOTE — PROGRESS NOTES
[address] [facility] [place] [address] [address]  Dept: [phone]  Dept Fax: [phone]  Loc: [phone]      Rachael Burnett is a 66 y.o. Black male. Jamie Delgado  presents to the [place] [address] clinic today for   Chief Complaint   Patient presents with    6 Month Follow-Up    Flank Pain     right side   , and;   No diagnosis found. I have reviewed [facility] medical, surgical and other pertinent history in detail, and have updated medication and allergy information in the computerized patient record. Clinical Care Team:     -Referring Provider for today's consult: self  -Primary Care Provider: Kusum Hawkins MD    Medical/Surgical History:   He  has a past medical history of Anorgasmia, Chest pain, Hemoptysis, Hemorrhoids, internal, with bleeding, Hypertension, LBP (low back pain), LFTs abnormal, Neuropathy, Paresthesia of left arm, and Pure hyperglyceridemia. His  has no past surgical history on file. Family/Social History:     His family history includes High Blood Pressure in his father; Stroke in his father. He  reports that he has never smoked. He has never used smokeless tobacco. He reports that he does not drink alcohol and does not use drugs. Medications/Allergies/Immunizations:     His current medication(s) include   Current Outpatient Medications:     sildenafil (VIAGRA) 100 MG tablet, TAKE 1 TABLET BY MOUTH APPROXIMATELY 1 HOUR BEFORE SEXUAL ACTIVITY.  DO NOT USE MORE THAN 1 DOSE DAILY, Disp: 2 tablet, Rfl: 3    acetaminophen (TYLENOL) 500 MG tablet, Take 500 mg by mouth 2 times daily, Disp: , Rfl:     ferrous gluconate (FERGON) 240 (27 Fe) MG tablet, Take 1 tablet by mouth 3 times daily (with meals), Disp: 180 tablet, Rfl: 2    vitamin D3 (CHOLECALCIFEROL) 25 MCG (1000 UT) TABS tablet, Take 4 tablets by mouth daily, Disp: 120 tablet, Rfl: 6    Omega-3 Fatty Acids (FISH OIL) 1000 MG CAPS, Take 3 capsules by mouth 3 times daily CVS pharmaceutical grade, Disp: 90 capsule, Rfl: 5    B Complex-Biotin-FA (B-100 TR) TBCR, Take 1 tablet by mouth 4 times daily (before meals and nightly) CVS B-100 Time released balanced B complex tablet, Disp: 120 tablet, Rfl: 5    Specialty Vitamins Products (MAGNESIUM, AMINO ACID CHELATE,) 133 MG tablet, Take 1 tablet by mouth 3 times daily, Disp: 90 tablet, Rfl: 6    penicillin v potassium (VEETID) 500 MG tablet, TAKE 1 TABLET BY MOUTH 3 TIMES DAILY, Disp: 100 tablet, Rfl: 3    Multiple Minerals-Vitamins (DINAH-MAG-ZINC-D) TABS, Take 1 tablet by mouth 4 times daily (before meals and nightly), Disp: 120 tablet, Rfl: 5    hydroCHLOROthiazide (HYDRODIURIL) 25 MG tablet, TAKE 1 TABLET BY MOUTH DAILY AT BREAKFAST AND DINNER IF LEG OR FOOT SWOLLEN (Patient not taking: Reported on 6/17/2021), Disp: 180 tablet, Rfl: 1    potassium bicarbonate (EFFER-K) 25 MEQ disintegrating tablet, Take 1 tablet by mouth 2 times daily (Patient not taking: Reported on 6/17/2020), Disp: 180 tablet, Rfl: 3    Cyanocobalamin (VITAMIN B 12 PO), Take 2 each by mouth daily , Disp: , Rfl:   Allergies: Ibuprofen  Immunizations:   Immunization History   Administered Date(s) Administered    COVID-19, Moderna, Primary or Immunocompromised, PF, 100mcg/0.5mL 01/15/2021, 02/12/2021, 10/27/2021    Influenza A (S4R6-36) Vaccine PF IM 12/15/2009    Influenza Vaccine, unspecified formulation 11/21/2013, 11/29/2017    Influenza Virus Vaccine 11/21/2013, 11/12/2014, 11/24/2015, 11/29/2017    Influenza Whole 09/28/2010    Influenza, High-dose, Quadv, 65 yrs +, IM (Fluzone) 11/29/2021    Influenza, Quadv, adjuvanted, 65 yrs +, IM, PF (Fluad) 09/26/2020    PPD Test 05/16/2011        History of Present Illness:     Goitom's had concerns including 6 Month Follow-Up and Flank Pain (right side). Hildegard Billing  presents to the [address] today for;   Chief Complaint performed in the outpatient setting.  -Patient has follow-up appointment with his PCP later this week \"      Subjective:     Review of Systems    Objective: There were no vitals taken for this visit. Physical Exam  Vitals and nursing note reviewed. Constitutional:       Appearance: Normal appearance. HENT:      Head: Normocephalic. Pulmonary:      Effort: Pulmonary effort is normal.   Neurological:      Mental Status: He is alert. Psychiatric:         Mood and Affect: Mood normal.         Thought Content: Thought content normal.            Laboratory Data:   Lab results were searched in Care Everywhere and/or those brought by the pateint were reviewed today with Joce and he has a copy of their most recent labs to take home with them as noted below;       Imaging Data:   Imaging Data:       Assessment & Plan:       Impression:  No diagnosis found. Assessment and Plan:  After reviewing the patients chief complaints, reviewing their labfindings in great detail (with the patient and those accompanying them) which correlate to their chief complaints, symptoms, and or medical conditions; suggestions were made relating to changes in diet and or supplements which may improve the complaints and which will be reflected in their future lab findings; Chief Complaint   Patient presents with    6 Month Follow-Up    Flank Pain     right side   ;    Plans for the next visits:  - Abnormal and non-optimal Labs were ordered today to be repeated in the next 120-365 days to assess changes from adjustments in nutrition and or nutrients.    - Patient instructed when having a blood draw to ask the  to divide their lab draws into multiple draws over several days if not feeling good at the time of the lab draw or if either prefers to do several smaller blood draws over several days  -Patient instructed to check with insurer before each lab draw and to go to the lab which the insurer directs them for the most cost effective lab draw with the least patient's cost  - Alexis Baptiste  will be scheduled subsequent to those results. - Alexis Baptiste will bring in his drink, food, supplement log to his next visit    Chronic Problems Addressed on this Visit:                                   1.  Intensity of Service; Uncontrolled items at this visit; Chief Complaint   Patient presents with    6 Month Follow-Up    Flank Pain     right side   ; Improved items at this visit and Stable items were discussed at this visit;  2. Patients food, drinks, supplements and symptoms were reviewed with the patient,       - Zacharyitomyron will bring food, drink, supplements and symptoms log to next visit for inclusion in their record      - 75 better food list reviewed & given to patient with the omega 6 food list to avoid      - The 52 Latex foods list was reviewed and given to the patients with the information on carrageenan         - Gluten in corn and oats abstracts sheet reviewed and given to the patient today   3. Greater than 40 minutes time was spent with the patient face to face on this visit; of which >50% was for counseling and coordination of care, as well as the time spent before and after the visit reviewing the chart, documenting the encounter, reviewing labs,reports, NIH listed studies, making phone calls, etc.      Patients food and drinks were reviewed with the patient,   - They will bring a food drink symptom log to future visits for inclusion in their record    - 75 better food list reviewed & given to patient along with the omega 6 food list to avoid      - Gluten in corn and oats abstracts sheet reviewed and given to the patient today    - 23 Foods containing Latex-like proteins was reviewed and copy to be taken if desired     - Nutrient Supplements list provided and copyto be taken if desired    - [employer]. [employer] web site offered to patient to review at their convenience by staff with login information    Note:  I have discussed with the patient that with all nutraceuticals, there is often mixed data and emerging research which needs to be monitored; as well as an array of NIH fact sheets on nutrients and supplements, available at www.nih,issue plus [employer]. [employer] plus www.TuneGOi,org. If I have recommended cinnamon at the request of this patient to assist them in control of their blood sugar, triglyceride, and/or weight issues. I discussed that the patient's clinical use of cinnamon bark, calcium, magnesium, Vitamin D, and pharmaceutical grade CVS omega 3 oil or triple-strength fish oil, and B-50/B-100 time-released B-complex by [address] [facility] will be for a time-limited trial to determine their individual effectiveness and safety in this patient. I also referred the patient to the NMCD: Nutrition, Metabolism, and Cardiovascular Diseases (SecuritiesCard.pl) and concerns about long-term use and hepatotoxicity of cinnamon and other nutrients. I suggested they frequently search nih.gov for the latest non-proprietary information on nutriceuticals as well as consider a subscription to [employer] for details on reviewed supplements, or at the least review the nutrient files at [facility] at [facility], [address] bark, an insulin mimetic, reduces some High Carbohydrate Dietary Impacts. Methylhydroxychalcone polymers insulin-enhancing properties in fat cells are responsible for enhanced glucose uptake, inhibiting hepatic HMG-CoA reductase and lowers lipids. www.jacn. org/content/20/4/327.full     But cinnamon with additives such as Cinnamon Extract are not effective as insulin mimetics.  :eStoreDirectory.at     Nutrients for Start up from [employer] or [employer] for ease to get started now;  Robbie Lerma has some useable products;  - Triple Strength Fish Oil, enteric coated  - Vit D-3 5000 IU gel caps  - Iron ferrous sulfate 325 mg tabs  - Centrum Silver look-a-like for most patients, or  - Centrum plain look-a-like if need iron    Local pharmacies or chains such as [employer], have:  - CVS pharmaceutical grade omega 3 is 90% EPA/DHA whereas most Triple strength fish oil are 75% EPA/DHA  - Triple Strength Fish Oil (enteric coated if available) or if not enteric coated, can take from freezer for less burps  - B-50 or B-100 released balanced B complex tabs by [address] [facility] bark 500 mg (without Chromium or extracts)   some brands list 1000 mg / serving of 2 capsules,    some brands have 1000 mg caps with the undesireable chromium extract  - Calcium carbonate/citrate, magnesium oxide/citrate, Vit D-3 as 3-4 tabs/caps/serving     Some Local Brands may contain Zinc which is acceptable for the first bottle or two  - Magnesium oxide 250 mg tabs for those having < 2 bowel movements daily  - Magnesium citrate 200 mg if having > 2 bowel movements/day  - Centrum Silver or look-a-like for most patients, Centrum plain or look-a-like with iron  - Vitamin D-3 comes as 1,000 IU or 2,000 IU or 5,000 IU gel caps or Liquid drops but keep Vitamin D levels <50 but >40     Some brands containing or derived from soy oil or corn oil are OK if not allergic to soy  - Elemental Iron 65 mg tabs at bedtime is available over the counter if need more iron     Usually turns bowel movements grey, green, or black but not a concern  - Apricot Kernel Oil (by Now) for dry skin sensitive perineal or perianal area skin    Nutrients for ongoing use by Mail order for less expense from www. Nuforce ;  - Strength Fish Oil , 240 Softgels Item [id number]  -B-100 time released balanced B complex Item #[id number]  - Cinnamon bark 500 mg without Chromium or extract Item #965930  - Calcium carbonate 1000 mg, Magnesium oxide 500 mg, Vit D-3 400 IU Item #[id number]  - Magnesium oxide 500 mg tabs Item #136052 if less than 2 bowel movements daily  - [employer] Seniors Item #408764 for most patients, One Daily Item #446969 with iron  - Vit D 3  1,000 Item #[id number]      2,000 IU Item #320154   Item #763460     Some brands containing orderived from soy oil or corn oil are OK if not allergic to soy    Nutrients for Special Needs by Mail order for less expense from www. puritan.com;  -Elemental Iron 65 mg tabs Item #777096 if need more iron for low iron on labs    Usually turns bowel movements grey, green or black but not a concern  - Time released Niacin 250 mg Item #984970 for cold intolerance, low libido or impotence  - DHEA 50 mg Item #[id number] for improving DHEA levels on labs if having Fatigue    If stools too loose substitute for your Magnesium oxide using;   Magnesium citrate 200 mg tabs (NOT liquid) at ZenCard   Magnesium gluconate 550 mg by Juliocesar Slaughter at Brevity or Kähu. com  Magnesium chloride foot soaks or body sprays  www.Crispify   Magnesium chloride flakes 14.99 Item #: [id number] if back-ordered, get spray  Magnesium threonate, Magtein also helps mental clarity and sleep    Food Drink Symptom Log;  I asked this patient to track these items and any other symptoms on their list on a weekly basis to documenttheir progress or lack of same. This can be done on the symptom tracking sheet I gave them at today's visit but looks like this:                                                      Rate on scale of 0-10 with zero = not noticeable  Symptom:                            Week 1               2                 3                 4               Etc            Hair loss    Foot cramps    Paresthesia    Aches    IBS (irritable bowel)    Constipation    Diarrhea  Nocturia (up to bathroom at night)    Fatigue/Energy level  Stress      On the other side of the sheet they can track their food, drink, environment, activity, symptoms etc      Avoiding Latex-like proteins in my foods;     Avocados, Bananas, Celery, Figs & Kiwi proteins have latex-like proteins to inflame our immune systems, plus 47 more foods  How Can I Have A Latex Allergy? Eating foods with latex-like protein exposes us to latex allergies. Our body cannot tell the differencebetween these latex-like proteins and latex from rubber products since many people are allergic to fruit, vegetables and latex. Read labels on pre-packaged foods. This list to avoid is only a guide if you are known allergicto latex or have a latex rash on your chin, cheeks and lines on your neck and chest. The amount of latex is different in each food product or fruit variety. Avoid out of Season if not grown locally:   Melon, Nectarine, Papaya, Cherry, Passion fruit, Plum, Chestnuts, and Tomato. Avocado, Banana, Celery, Figs, and Kiwi always contain Latex-like protein. Whats in Season? Strawberries taste better in June than December because June is strawberry season so buy locally grown produce \"in season\" for the best flavor, cost, and less Latex. Locally grown produce not only tastes great but also requires little or no ethylene exposure in food distribution so has less latex content. Out of season: use canned, frozen, or dried since those are processed ripe and latex content is lower!!!     Month     [place] Locally Grown Produce  January, February, March: use canned, frozen or dried fruits since lower in latex  April: asparagus, radishes  May: asparagus, broccoli, green onions, greens, peas, radishes, rhubarb  Jennie: asparagus, beets, beans, broccoli, cabbage, cantaloupe, carrots, green onions, greens, lettuce, onions, parsley, peas, radishes, rhubarb, strawberries, watermelons  July: beans, beets, blueberries, broccoli, cabbage, cantaloupe, carrots, cauliflower, celery, cucumbers, eggplant, grapes, green onions, greens, lettuce, onions, parsley, peas, peaches, bell peppers, potatoes, radishes, summer raspberries, squash, sweetcorn, tomatoes, turnips, watermelons  August: apples, beans, beets, blueberries, cabbage, cantaloupe, carrots, cauliflower, celery, cucumbers, eggplant, grapes, green onions, greens, lettuce, onions, parsley, peas, peaches, pears, bell peppers, potatoes, radishes, squash, sweet corn, tomatoes, turnips, watermelons  September: apples, beans, beets, blueberries, cabbage, cantaloupe, carrots, cauliflower, celery, cucumbers, eggplant, grapes, green onions, greens, lettuce, onions, parsley, peas, peaches, pears, bell peppers, plums, potatoes, pumpkins, radishes, fall red raspberries, squash, sweet corn, tomatoes, turnips, watermelons  October: apples, beets, broccoli, cabbage, carrots, cauliflower, celery, green onions, greens, lettuce, parsley, peas, pears, potatoes, pumpkins, radishes, fall red raspberries, squash, turnips  November: broccoli, cabbage, carrots, parsley, pears, peas  December: use canned, frozen or dried fruits since lower in latex    Upto half of latex-sensitive patients show allergic reactions to fruits (avocados, bananas, kiwifruits, papayas, peaches),   Annals of Allergy, 1994. These plants contain the same proteins that are allergens in latex. People with fruit allergies should warn physicians before undergoing procedures which may cause anaphylactic reaction if in contact with latex gloves. Some of the common foods with defined cross-reactivity to latex are avocado, banana, kiwi, chestnut, raw potato, tomato, stone fruits (e.g., peach, cherry), hazelnut, melons, celery, carrot, apple, pear, papaya, and almond. Foods with less well-defined cross-reactivity to latex are peanuts, peppers, citrus fruits, coconut, pineapple, thom, fig, passion fruit, Ugli fruit, and grape. This fruit/latex cross-reactivity is worsened by ethylene, a gas used to hasten commercial ripening. In nature, plants produce low levels of the hormone ethylene, which regulates germination, flowering, and ripening.  Forced ripening by high ethylene concentrations, plants produce allergenic wound-repair proteins, which are similar to wound-repair proteins made during the tapping of rubber trees. Sensitive individuals who ingest the fruit get a higher dose and worse reaction. Some people may even first become sensitized to latex through fruit. Can food processing increase the concentrations of allergenic proteins? Latex-sensitized children (and adults) in [place] often experience allergic reactions after eating bananas ripened artificially with ethylene. In the [place], food distribution centers treat unripe bananas and other produce with ethylene to ripen; not commonly done in [facility] since fruit is tree-ripened there. Does treatment of food with ethylene induce banana proteins that cross-react with latex? (Nic et al.)    References:   Latex in Foods Allergy, http://ehp.niehs.nih.gov/members/2003/5811/5811.html    Search web for [employer] in Season \" for where you live or are at the time you food shop   Management of Latex, ://medicalcenter. [facility].edu/  search for nih, latex-like proteins in foods

## 2022-01-05 DIAGNOSIS — E78.5 ELEVATED LIPIDS: Primary | ICD-10-CM

## 2022-01-05 RX ORDER — SIMVASTATIN 10 MG
10 TABLET ORAL NIGHTLY
Qty: 90 TABLET | Refills: 2 | Status: SHIPPED | OUTPATIENT
Start: 2022-01-05 | End: 2022-10-06 | Stop reason: SDUPTHER

## 2022-01-05 NOTE — TELEPHONE ENCOUNTER
Pt called, said he now needs a refill for his cholesterol medication. Said he stopped it because he didn't need it, he now needs it again.     Order pended, please review approve or deny

## 2022-02-02 ENCOUNTER — TELEPHONE (OUTPATIENT)
Dept: FAMILY MEDICINE CLINIC | Age: 72
End: 2022-02-02

## 2022-02-02 NOTE — TELEPHONE ENCOUNTER
Patient has seen his blood pressure systolic readings go from  and the diastolic number stays around 70-79. He has gone to the pharmacy and taken it and they have told him its too low and to stop the medication for a day or 2 and then start it again. I asked how he feels? He said he feels good. Denies dizziness or feeling off balance. Please advise .

## 2022-02-25 ENCOUNTER — TELEPHONE (OUTPATIENT)
Dept: FAMILY MEDICINE CLINIC | Age: 72
End: 2022-02-25

## 2022-02-25 NOTE — TELEPHONE ENCOUNTER
Called yesterday (forgot to document)- went to voice mail. Called again today to make sure he understood. Said he didn't get the message. Advised of results. Told him he must contact Dr. Arnulfo Whitten for follow up. Verena Greco he has a kidney stone and colitis. He asked if he needed surgery- said possibly, but he should double ck with his pcp to make sure.

## 2022-04-05 ENCOUNTER — TELEPHONE (OUTPATIENT)
Dept: FAMILY MEDICINE CLINIC | Age: 72
End: 2022-04-05

## 2022-04-05 NOTE — TELEPHONE ENCOUNTER
[facility] had received labs by you for a PSA and a Dr. Monico Nageotte for a PSA. They cancelled yours and are just going to do Dr. Roque Encarnacion. The lab said they were ordered just about the same. I did not see that you ordered a psa.

## 2022-04-21 ENCOUNTER — OFFICE VISIT (OUTPATIENT)
Dept: FAMILY MEDICINE CLINIC | Age: 72
End: 2022-04-21
Payer: MEDICARE

## 2022-04-21 VITALS
BODY MASS INDEX: 26.22 KG/M2 | HEART RATE: 73 BPM | TEMPERATURE: 97.1 F | RESPIRATION RATE: 12 BRPM | HEIGHT: 68 IN | DIASTOLIC BLOOD PRESSURE: 66 MMHG | WEIGHT: 173 LBS | OXYGEN SATURATION: 98 % | SYSTOLIC BLOOD PRESSURE: 102 MMHG

## 2022-04-21 DIAGNOSIS — G89.29 CHRONIC MIDLINE LOW BACK PAIN WITHOUT SCIATICA: ICD-10-CM

## 2022-04-21 DIAGNOSIS — R76.11 NONSPECIFIC REACTION TO TUBERCULIN SKIN TEST WITHOUT ACTIVE TUBERCULOSIS: ICD-10-CM

## 2022-04-21 DIAGNOSIS — M54.50 CHRONIC MIDLINE LOW BACK PAIN WITHOUT SCIATICA: ICD-10-CM

## 2022-04-21 DIAGNOSIS — I10 ESSENTIAL HYPERTENSION: ICD-10-CM

## 2022-04-21 DIAGNOSIS — M79.606 PAIN OF LOWER EXTREMITY, UNSPECIFIED LATERALITY: ICD-10-CM

## 2022-04-21 DIAGNOSIS — G56.90: ICD-10-CM

## 2022-04-21 DIAGNOSIS — E55.9 VITAMIN D DEFICIENCY: ICD-10-CM

## 2022-04-21 DIAGNOSIS — J01.00 SUBACUTE MAXILLARY SINUSITIS: ICD-10-CM

## 2022-04-21 DIAGNOSIS — E72.11 HOMOCYSTINURIA (HCC): Primary | ICD-10-CM

## 2022-04-21 DIAGNOSIS — R20.9 DISTURBANCE OF SKIN SENSATION: ICD-10-CM

## 2022-04-21 DIAGNOSIS — C84.00 MYCOSIS FUNGOIDES, UNSPECIFIED BODY REGION (HCC): ICD-10-CM

## 2022-04-21 DIAGNOSIS — R21 RASH AND OTHER NONSPECIFIC SKIN ERUPTION: ICD-10-CM

## 2022-04-21 DIAGNOSIS — R68.2 DRY MOUTH: ICD-10-CM

## 2022-04-21 DIAGNOSIS — E86.0 DEHYDRATION: ICD-10-CM

## 2022-04-21 DIAGNOSIS — R05.9 COUGH: ICD-10-CM

## 2022-04-21 DIAGNOSIS — R79.83 HOMOCYSTEINEMIA: ICD-10-CM

## 2022-04-21 DIAGNOSIS — M62.81 MUSCLE WEAKNESS (GENERALIZED): ICD-10-CM

## 2022-04-21 DIAGNOSIS — R94.5 NONSPECIFIC ABNORMAL RESULTS OF LIVER FUNCTION STUDY: ICD-10-CM

## 2022-04-21 DIAGNOSIS — R04.2 HEMOPTYSIS: ICD-10-CM

## 2022-04-21 DIAGNOSIS — R10.11 ABDOMINAL PAIN, RIGHT UPPER QUADRANT: ICD-10-CM

## 2022-04-21 DIAGNOSIS — R35.1 NOCTURIA: ICD-10-CM

## 2022-04-21 DIAGNOSIS — R07.89 OTHER CHEST PAIN: ICD-10-CM

## 2022-04-21 DIAGNOSIS — E78.5 LIPIDS BLOOD INCREASED: ICD-10-CM

## 2022-04-21 DIAGNOSIS — R58 HEMORRHAGE: ICD-10-CM

## 2022-04-21 DIAGNOSIS — R79.9 ABNORMAL FINDING OF BLOOD CHEMISTRY: ICD-10-CM

## 2022-04-21 DIAGNOSIS — E78.1 PURE HYPERGLYCERIDEMIA: ICD-10-CM

## 2022-04-21 DIAGNOSIS — G57.11 MERALGIA PARESTHETICA OF RIGHT SIDE: ICD-10-CM

## 2022-04-21 PROCEDURE — 3017F COLORECTAL CA SCREEN DOC REV: CPT | Performed by: FAMILY MEDICINE

## 2022-04-21 PROCEDURE — 1036F TOBACCO NON-USER: CPT | Performed by: FAMILY MEDICINE

## 2022-04-21 PROCEDURE — 4040F PNEUMOC VAC/ADMIN/RCVD: CPT | Performed by: FAMILY MEDICINE

## 2022-04-21 PROCEDURE — G8417 CALC BMI ABV UP PARAM F/U: HCPCS | Performed by: FAMILY MEDICINE

## 2022-04-21 PROCEDURE — 99215 OFFICE O/P EST HI 40 MIN: CPT | Performed by: FAMILY MEDICINE

## 2022-04-21 PROCEDURE — G8427 DOCREV CUR MEDS BY ELIG CLIN: HCPCS | Performed by: FAMILY MEDICINE

## 2022-04-21 PROCEDURE — 1123F ACP DISCUSS/DSCN MKR DOCD: CPT | Performed by: FAMILY MEDICINE

## 2022-04-21 RX ORDER — MAGNESIUM OXIDE/MAG AA CHELATE 133 MG
1 TABLET ORAL
Qty: 120 TABLET | Refills: 6 | Status: SHIPPED | OUTPATIENT
Start: 2022-04-21

## 2022-04-21 RX ORDER — MELATONIN
2000 DAILY
Qty: 60 TABLET | Refills: 6 | Status: SHIPPED | OUTPATIENT
Start: 2022-04-21

## 2022-04-21 RX ORDER — TAMSULOSIN HYDROCHLORIDE 0.4 MG/1
0.4 CAPSULE ORAL EVERY OTHER DAY
COMMUNITY

## 2022-04-21 RX ORDER — UBIDECARENONE 75 MG
1 CAPSULE ORAL
Qty: 60 TABLET | Refills: 6 | Status: SHIPPED | OUTPATIENT
Start: 2022-04-21

## 2022-04-21 RX ORDER — PENICILLIN V POTASSIUM 500 MG/1
TABLET ORAL
Qty: 100 TABLET | Refills: 3 | Status: SHIPPED | OUTPATIENT
Start: 2022-04-21

## 2022-04-21 RX ORDER — FERROUS SULFATE 325(65) MG
325 TABLET ORAL
Qty: 90 TABLET | Refills: 1 | Status: SHIPPED | OUTPATIENT
Start: 2022-04-21 | End: 2022-10-06

## 2022-04-21 RX ORDER — POTASSIUM CHLORIDE 750 MG/1
10 TABLET, EXTENDED RELEASE ORAL DAILY
Qty: 90 TABLET | Refills: 1 | Status: SHIPPED | OUTPATIENT
Start: 2022-04-21

## 2022-04-21 ASSESSMENT — ENCOUNTER SYMPTOMS
RESPIRATORY NEGATIVE: 1
ALLERGIC/IMMUNOLOGIC NEGATIVE: 1
GASTROINTESTINAL NEGATIVE: 1

## 2022-04-21 NOTE — PATIENT INSTRUCTIONS
Thank you   1. Thank you for trusting us with your healthcare needs. You may receive a survey regarding today's visit. It would help us out if you would take a few moments to provide your feedback. We value your input. 2. Please bring in ALL medications BOTTLES, including inhalers, herbal supplements, over the counter, prescribed & non-prescribed medicine. The office would like actual medication bottles and a list.   3. Please note our OFFICE POLICIES:   a. Prior to getting your labs drawn, please check with your insurance company for benefits and eligibility of lab services. Often, insurance companies cover certain tests for preventative visits only. It is patient's responsibility to see what is covered. b. We are unable to change a diagnosis after the test has been performed. c. Lab orders will not be re-printed. Please hold onto your original lab orders and take them to your lab to be completed. d. If you no show your scheduled appointment three times, you will be dismissed from this practice. e. Usman Maker must be completed 24 hours prior to your schedule appointment. 4. If the list below has been completed, PLEASE FAX RECORDS TO OUR OFFICE @ [phone].  Once the records have been received we will update your records at our office:  Health Maintenance Due   Topic Date Due    Pneumococcal 65+ years Vaccine (1 - PCV) Never done    DTaP/Tdap/Td vaccine (1 - Tdap) Never done    Shingles Vaccine (1 of 2) Never done   [employer] Visit (AWV)  Never done    COVID-19 Vaccine (4 - Booster for Josette Pedlar series) 01/27/2022

## 2022-04-21 NOTE — PROGRESS NOTES
Pure hyperglyceridemia. His  has no past surgical history on file. Family/Social History:     His family history includes High Blood Pressure in his father; Stroke in his father. He  reports that he has never smoked. He has never used smokeless tobacco. He reports that he does not drink alcohol and does not use drugs.     Medications/Allergies/Immunizations:     His current medication(s) include   Current Outpatient Medications:     tamsulosin (FLOMAX) 0.4 MG capsule, Take 0.4 mg by mouth every other day, Disp: , Rfl:     potassium chloride (KLOR-CON M) 10 MEQ extended release tablet, Take 1 tablet by mouth daily, Disp: 90 tablet, Rfl: 1    Specialty Vitamins Products (MAGNESIUM, AMINO ACID CHELATE,) 133 MG tablet, Take 1 tablet by mouth 4 times daily (with meals and nightly), Disp: 120 tablet, Rfl: 6    B Complex-Biotin-FA (B-100 TR) TBCR, Take 1 tablet by mouth 2 times daily (before meals) CVS B-100 Time released balanced B complex tablet, Disp: 60 tablet, Rfl: 6    penicillin v potassium (VEETID) 500 MG tablet, TAKE 1 TABLET BY MOUTH 3 TIMES DAILY, Disp: 100 tablet, Rfl: 3    Ascorbic Acid  MG TBCR, Take 1 tablet by mouth 4 times daily (before meals and nightly), Disp: 120 tablet, Rfl: 6    Lysine 500 MG TABS, Take 1 tablet by mouth 4 times daily (before meals and nightly), Disp: 120 tablet, Rfl: 6    vitamin D3 (CHOLECALCIFEROL) 25 MCG (1000 UT) TABS tablet, Take 2 tablets by mouth daily, Disp: 60 tablet, Rfl: 6    ferrous sulfate (IRON 325) 325 (65 Fe) MG tablet, Take 1 tablet by mouth daily (with breakfast), Disp: 90 tablet, Rfl: 1    Vitamin A 4.5 MG (11653 UT) TABS, Take 1 tablet by mouth daily, Disp: 30 tablet, Rfl: 6    simvastatin (ZOCOR) 10 MG tablet, Take 1 tablet by mouth nightly, Disp: 90 tablet, Rfl: 2    acetaminophen (TYLENOL) 500 MG tablet, Take 500 mg by mouth 2 times daily, Disp: , Rfl:     ferrous gluconate (FERGON) 240 (27 Fe) MG tablet, Take 1 tablet by mouth 3 times daily (with meals), Disp: 180 tablet, Rfl: 2    Omega-3 Fatty Acids (FISH OIL) 1000 MG CAPS, Take 3 capsules by mouth 3 times daily CVS pharmaceutical grade, Disp: 90 capsule, Rfl: 5    hydroCHLOROthiazide (HYDRODIURIL) 25 MG tablet, TAKE 1 TABLET BY MOUTH DAILY AT BREAKFAST AND DINNER IF LEG OR FOOT SWOLLEN, Disp: 180 tablet, Rfl: 1    sildenafil (VIAGRA) 100 MG tablet, TAKE 1 TABLET BY MOUTH APPROXIMATELY 1 HOUR BEFORE SEXUAL ACTIVITY. DO NOT USE MORE THAN 1 DOSE DAILY (Patient not taking: Reported on 4/21/2022), Disp: 2 tablet, Rfl: 3    Multiple Minerals-Vitamins (DINAH-MAG-ZINC-D) TABS, Take 1 tablet by mouth 4 times daily (before meals and nightly) (Patient not taking: Reported on 4/21/2022), Disp: 120 tablet, Rfl: 5    Cyanocobalamin (VITAMIN B 12 PO), Take 2 each by mouth daily  (Patient not taking: Reported on 4/21/2022), Disp: , Rfl:   Allergies: Ibuprofen  Immunizations:   Immunization History   Administered Date(s) Administered    COVID-19, Moderna, Primary or Immunocompromised, PF, 100mcg/0.5mL 01/15/2021, 02/12/2021, 10/27/2021    Influenza A (Y0O0-13) Vaccine PF IM 12/15/2009    Influenza Vaccine, unspecified formulation 11/21/2013, 11/29/2017    Influenza Virus Vaccine 11/21/2013, 11/12/2014, 11/24/2015, 11/29/2017    Influenza Whole 09/28/2010    Influenza, High-dose, Quadv, 65 yrs +, IM (Fluzone) 11/29/2021    Influenza, Quadv, adjuvanted, 65 yrs +, IM, PF (Fluad) 09/26/2020    PPD Test 05/16/2011        History of Present Illness:     Goitom's had concerns including Discuss Labs, Follow-up, Medication Refill (simvastatin, hydrochloride), and Eye Problem (right?). Anaya Hanks  presents to the [address] [place] [address] today for;   Chief Complaint   Patient presents with    Discuss Labs    Follow-up    Medication Refill     simvastatin, hydrochloride    Eye Problem     right?   , abnormal labs follow up and these conditions as he  Is looking today for:     1. Homocystinuria ([facility] [address]    2. Essential hypertension    3. Homocysteinemia    4. Pure hyperglyceridemia    5. Muscle weakness (generalized)    6. Meralgia paresthetica of right side    7. Lipids blood increased    8. Pain of lower extremity, unspecified laterality    9. Chronic midline low back pain without sciatica    10. Cough    11. Dehydration    12. Hemoptysis    13. Hemorrhage    14. Nonspecific abnormal results of liver function study    15. Mononeuritis of upper limb, unspecified laterality    16. Nonspecific reaction to tuberculin skin test without active tuberculosis    17. Other chest pain    18. Disturbance of skin sensation    19. Rash and other nonspecific skin eruption    20. Abdominal pain, right upper quadrant    21. Dry mouth    22. Vitamin D deficiency     23. Abnormal finding of blood chemistry     24. Nocturia     25. Mycosis fungoides, unspecified body region ([facility] [address] [facility]    26. Subacute maxillary sinusitis      HPI    Subjective:     Review of Systems   Constitutional: Positive for fatigue. HENT: Negative. Eyes: Positive for visual disturbance. Respiratory: Negative. Cardiovascular: Negative. Gastrointestinal: Negative. Endocrine: Negative. Genitourinary: Positive for difficulty urinating and testicular pain (shrinking). Musculoskeletal: Positive for arthralgias, myalgias, neck pain and neck stiffness. Allergic/Immunologic: Negative. Neurological: Positive for dizziness, weakness (when in hot water too long) and light-headedness. All other systems reviewed and are negative. Objective:     /66 (Site: Left Upper Arm, Position: Sitting, Cuff Size: Medium Adult)   Pulse 73   Temp 97.1 °F (36.2 °C) (Temporal)   Resp 12   Ht 5' 8.27\" (1.734 m)   Wt 173 lb (78.5 kg)   SpO2 98%   BMI 26.10 kg/m²   Physical Exam  Vitals and nursing note reviewed.             Laboratory Data:   Lab results were searched in Care Everywhere and/or those brought by the pateint were reviewed today with Joce and he has a copy of their most recent labs to take home with them as noted below;       Imaging Data:   Imaging Data:       Assessment & Plan:       Impression:  1. Homocystinuria ([facility] [address]    2. Essential hypertension    3. Homocysteinemia    4. Pure hyperglyceridemia    5. Muscle weakness (generalized)    6. Meralgia paresthetica of right side    7. Lipids blood increased    8. Pain of lower extremity, unspecified laterality    9. Chronic midline low back pain without sciatica    10. Cough    11. Dehydration    12. Hemoptysis    13. Hemorrhage    14. Nonspecific abnormal results of liver function study    15. Mononeuritis of upper limb, unspecified laterality    16. Nonspecific reaction to tuberculin skin test without active tuberculosis    17. Other chest pain    18. Disturbance of skin sensation    19. Rash and other nonspecific skin eruption    20. Abdominal pain, right upper quadrant    21. Dry mouth    22. Vitamin D deficiency     23. Abnormal finding of blood chemistry     24. Nocturia     25. Mycosis fungoides, unspecified body region ([facility] [address]    26. Subacute maxillary sinusitis      Assessment and Plan:  After reviewing the patients chief complaints, reviewing their labfindings in great detail (with the patient and those accompanying them) which correlate to their chief complaints, symptoms, and or medical conditions; suggestions were made relating to changes in diet and or supplements which may improve the complaints and which will be reflected in their future lab findings; Chief Complaint   Patient presents with    Discuss Labs    Follow-up    Medication Refill     simvastatin, hydrochloride    Eye Problem     right?   ;    Plans for the next visits:  - Abnormal and non-optimal Labs were ordered today to be repeated in the next 120-365 days to assess changes from adjustments in nutrition and or nutrients.    - Patient instructed when having a blood draw to ask the  to divide their lab draws into multiple draws over several days if not feeling good at the time of the lab draw or if either prefers to do several smaller blood draws over several days  -Patient instructed to check with insurer before each lab draw and to go to the lab which the insurer directs them for the most cost effective lab draw with the least patient's cost  - Kushal Holman  will be scheduled subsequent to those results. - Kushal Holman will bring in his drink, food, supplement log to his next visit    Chronic Problems Addressed on this Visit:                                   1.  Intensity of Service; Uncontrolled items at this visit; Chief Complaint   Patient presents with    Discuss Labs    Follow-up    Medication Refill     simvastatin, hydrochloride    Eye Problem     right?   ;              Improved items at this visit and Stable items were discussed at this visit;  2. Patients food, drinks, supplements and symptoms were reviewed with the patient,       - Zacharyitom will bring food, drink, supplements and symptoms log to next visit for inclusion in their record      - 75 better food list reviewed & given to patient with the omega 6 food list to avoid      - The 52 Latex foods list was reviewed and given to the patients with the information on carrageenan         - Gluten in corn and oats abstracts sheet reviewed and given to the patient today   3.    Greater than 40 minutes time was spent with the patient face to face on this visit; of which >50% was for counseling and coordination of care, as well as the time spent before and after the visit reviewing the chart, documenting the encounter, reviewing labs,reports, NIH listed studies, making phone calls, etc.      Patients food and drinks were reviewed with the patient,   - They will bring a food drink symptom log to future visits for inclusion in their record    - 75 better food list reviewed & given to patient along with the omega 6 food list to avoid      - Gluten in corn and oats abstracts sheet reviewed and given to the patient today    - 23 Foods containing Latex-like proteins was reviewed and copy to be taken if desired     - Nutrient Supplements list provided and copyto be taken if desired    - XMPie. ZapMe web site offered to patient to review at their convenience by staff with login information    Note:  I have discussed with the patient that with all nutraceuticals, there is often mixed data and emerging research which needs to be monitored; as well as an array of NIH fact sheets on nutrients and supplements, available at www.nih,issue plus [place]. ZapMe plus www.Kabam,org. If I have recommended cinnamon at the request of this patient to assist them in control of their blood sugar, triglyceride, and/or weight issues. I discussed that the patient's clinical use of cinnamon bark, calcium, magnesium, Vitamin D, and pharmaceutical grade CVS omega 3 oil or triple-strength fish oil, and B-50/B-100 time-released B-complex by [address] [facility] will be for a time-limited trial to determine their individual effectiveness and safety in this patient. I also referred the patient to the NMCD: Nutrition, Metabolism, and Cardiovascular Diseases (SecuritiesCard.pl) and concerns about long-term use and hepatotoxicity of cinnamon and other nutrients. I suggested they frequently search nih.gov for the latest non-proprietary information on nutriceuticals as well as consider a subscription to Exabeam for details on reviewed supplements, or at the least review the nutrient files at [facility], [address] bark, an insulin mimetic, reduces some High Carbohydrate Dietary Impacts. Methylhydroxychalcone polymers insulin-enhancing properties in fat cells are responsible for enhanced glucose uptake, inhibiting hepatic HMG-CoA reductase and lowers lipids. www.jacn. org/content/20/4/327.full     But cinnamon with additives such as Cinnamon Extract are not effective as insulin mimetics.  :eStoreDirectory.at     Nutrients for Start up from [employer] or [employer]Rank for ease to get started now;  Robbie Lerma has some useable products;  - Triple Strength Fish Oil, enteric coated  - Vit D-3 5000 IU gel caps  - Iron ferrous sulfate 325 mg tabs  - Centrum Silver look-a-like for most patients, or  - Centrum plain look-a-like if need iron    Local pharmacies or chains such as [employer], have:  - X Plus [employer] Solutions pharmaceutical grade omega 3 is 90% EPA/DHA whereas most Triple strength fish oil are 75% EPA/DHA  - Triple Strength Fish Oil (enteric coated if available) or if not enteric coated, can take from freezer for less burps  - B-50 or B-100 released balanced B complex tabs by [id number] [facility] bark 500 mg (without Chromium or extracts)   some brands list 1000 mg / serving of 2 capsules,    some brands have 1000 mg caps with the undesireable chromium extract  - Calcium carbonate/citrate, magnesium oxide/citrate, Vit D-3 as 3-4 tabs/caps/serving     Some Local Brands may contain Zinc which is acceptable for the first bottle or two  - Magnesium oxide 250 mg tabs for those having < 2 bowel movements daily  - Magnesium citrate 200 mg if having > 2 bowel movements/day  - Centrum Silver or look-a-like for most patients, Centrum plain or look-a-like with iron  - Vitamin D-3 comes as 1,000 IU or 2,000 IU or 5,000 IU gel caps or Liquid drops but keep Vitamin D levels <50 but >40     Some brands containing or derived from soy oil or corn oil are OK if not allergic to soy  - Elemental Iron 65 mg tabs at bedtime is available over the counter if need more iron     Usually turns bowel movements grey, green, or black but not a concern  - Apricot Kernel Oil (by Now) for dry skin sensitive perineal or perianal area skin    Nutrients for ongoing use by Mail order for less expense from www. Chibwe ;  - Strength Fish Oil , 240 Softgels Item #[id number]  -B-100 time released balanced B complex Item #557621  - Cinnamon bark 500 mg without Chromium or extract Item #222592  - Calcium carbonate 1000 mg, Magnesium oxide 500 mg, Vit D-3 400 IU Item #[id number]  - Magnesium oxide 500 mg tabs Item #698666 if less than 2 bowel movements daily  - ABC Seniors Item #187049 for most patients, One Daily Item #245944 with iron  - Vit D 3  1,000 Item #725888      2,000 IU Item #193764   Item #630817     Some brands containing orderived from soy oil or corn oil are OK if not allergic to soy    Nutrients for Special Needs by Mail order for less expense from www. puritan.com;  -Elemental Iron 65 mg tabs Item #636354 if need more iron for low iron on labs    Usually turns bowel movements grey, green or black but not a concern  - Time released Niacin 250 mg Item #190421 for cold intolerance, low libido or impotence  - DHEA 50 mg Item #544831 for improving DHEA levels on labs if having Fatigue    If stools too loose substitute for your Magnesium oxide using;   Magnesium citrate 200 mg tabs (NOT liquid) at [employer]   Magnesium gluconate 550 mg by Ophelia at [employer] or Kähu. com  Magnesium chloride foot soaks or body sprays  www.[employer]   Magnesium chloride flakes 14.99 Item #: [id number] if back-ordered, get spray  Magnesium threonate, Magtein also helps mental clarity and sleep    Food Drink Symptom Log;  I asked this patient to track these items and any other symptoms on their list on a weekly basis to documenttheir progress or lack of same.  This can be done on the symptom tracking sheet I gave them at today's visit but looks like this:                                                      Rate on scale of 0-10 with zero = not noticeable  Symptom:                            Week 1               2                 3                 4               Etc            Hair loss    Foot cramps    Paresthesia Aches    IBS (irritable bowel)    Constipation    Diarrhea  Nocturia (up to bathroom at night)    Fatigue/Energy level  Stress      On the other side of the sheet they can track their food, drink, environment, activity, symptoms etc      Avoiding Latex-like proteins in my foods; Avocados, Bananas, Celery, Figs & Kiwi proteins have latex-like proteins to inflame our immune systems, plus 47 more foods  How Can I Have A Latex Allergy? Eating foods with latex-like protein exposes us to latex allergies. Our body cannot tell the differencebetween these latex-like proteins and latex from rubber products since many people are allergic to fruit, vegetables and latex. Read labels on pre-packaged foods. This list to avoid is only a guide if you are known allergicto latex or have a latex rash on your chin, cheeks and lines on your neck and chest. The amount of latex is different in each food product or fruit variety. Avoid out of Season if not grown locally:   Melon, Nectarine, Papaya, Cherry, Passion fruit, Plum, Chestnuts, and Tomato. Avocado, Banana, Celery, Figs, and Kiwi always contain Latex-like protein. Whats in Season? Strawberries taste better in June than December because June is strawberry season so buy locally grown produce \"in season\" for the best flavor, cost, and less Latex. Locally grown produce not only tastes great but also requires little or no ethylene exposure in food distribution so has less latex content. Out of season: use canned, frozen, or dried since those are processed ripe and latex content is lower!!!     Month     [place] Locally Grown Produce  January, February, March: use canned, frozen or dried fruits since lower in latex  April: asparagus, radishes  May: asparagus, broccoli, green onions, greens, peas, radishes, rhubarb  June: asparagus, beets, beans, broccoli, cabbage, cantaloupe, carrots, green onions, greens, lettuce, onions, parsley, peas, radishes, rhubarb, strawberries, watermelons  July: beans, beets, blueberries, broccoli, cabbage, cantaloupe, carrots, cauliflower, celery, cucumbers, eggplant, grapes, green onions, greens, lettuce, onions, parsley, peas, peaches, bell peppers, potatoes, radishes, summer raspberries, squash, sweetcorn, tomatoes, turnips, watermelons  August: apples, beans, beets, blueberries, cabbage, cantaloupe, carrots, cauliflower, celery, cucumbers, eggplant, grapes, green onions, greens, lettuce, onions, parsley, peas, peaches, pears, bell peppers, potatoes, radishes, squash, sweet corn, tomatoes, turnips, watermelons  September: apples, beans, beets, blueberries, cabbage, cantaloupe, carrots, cauliflower, celery, cucumbers, eggplant, grapes, green onions, greens, lettuce, onions, parsley, peas, peaches, pears, bell peppers, plums, potatoes, pumpkins, radishes, fall red raspberries, squash, sweet corn, tomatoes, turnips, watermelons  October: apples, beets, broccoli, cabbage, carrots, cauliflower, celery, green onions, greens, lettuce, parsley, peas, pears, potatoes, pumpkins, radishes, fall red raspberries, squash, turnips  November: broccoli, cabbage, carrots, parsley, pears, peas  December: use canned, frozen or dried fruits since lower in latex    Upto half of latex-sensitive patients show allergic reactions to fruits (avocados, bananas, kiwifruits, papayas, peaches),   Annals of Allergy, 1994. These plants contain the same proteins that are allergens in latex. People with fruit allergies should warn physicians before undergoing procedures which may cause anaphylactic reaction if in contact with latex gloves. Some of the common foods with defined cross-reactivity to latex are avocado, banana, kiwi, chestnut, raw potato, tomato, stone fruits (e.g., peach, cherry), hazelnut, melons, celery, carrot, apple, pear, papaya, and almond.   Foods with less well-defined cross-reactivity to latex are peanuts, peppers, citrus fruits, coconut, pineapple, thom, fig, passion fruit, Ugli fruit, and grape. This fruit/latex cross-reactivity is worsened by ethylene, a gas used to hasten commercial ripening. In nature, plants produce low levels of the hormone ethylene, which regulates germination, flowering, and ripening. Forced ripening by high ethylene concentrations, plants produce allergenic wound-repair proteins, which are similar to wound-repair proteins made during the tapping of rubber trees. Sensitive individuals who ingest the fruit get a higher dose and worse reaction. Some people may even first become sensitized to latex through fruit. Can food processing increase the concentrations of allergenic proteins? Latex-sensitized children (and adults) in [place] often experience allergic reactions after eating bananas ripened artificially with ethylene. In the [place], food distribution centers treat unripe bananas and other produce with ethylene to ripen; not commonly done in [facility] since fruit is tree-ripened there. Does treatment of food with ethylene induce banana proteins that cross-react with latex? (Nic et al.)    References:   Latex in Foods Allergy, http://ehp.niehs.nih.gov/members/2003/5811/5811.html    Search web for [employer] in Season \" for where you live or are at the time you food shop   Management of Latex, ://medicalcenter. [facility].edu/  search for nih, latex-like proteins in foods

## 2022-05-10 NOTE — TELEPHONE ENCOUNTER
Notified pt he will contact his family dr to order the ultrasound  He had no further questions or concerns

## 2022-05-10 NOTE — TELEPHONE ENCOUNTER
Pt had left a message on Dr Cecy Holder office ans machine  I ret pt call   C/o right upper abd pains,nausea, no appetitie after starting new medication 10 days ago   Dr Karishma Macario states pt should increase water intake, and  increase magnesium.   Pt informed he states he does not drink enough water(inst him 8 bottles of water a day)  Pt states he already takes magnesium plus protein i po BID      inst pt to take ii po BID pt is very worried about his liver and would like an ultrasound ordered   I informed pt I will check with Dr Karishma Macario and notify him of his response

## 2022-06-01 RX ORDER — BACLOFEN 10 MG/1
10 TABLET ORAL 3 TIMES DAILY
Qty: 30 TABLET | Refills: 1 | Status: SHIPPED | OUTPATIENT
Start: 2022-06-01

## 2022-07-21 ENCOUNTER — TELEPHONE (OUTPATIENT)
Dept: FAMILY MEDICINE CLINIC | Age: 72
End: 2022-07-21

## 2022-07-21 NOTE — TELEPHONE ENCOUNTER
Pt called to ask if Dr. Ta Anderson could order an iron transfusion for him. He said back when he was in the hospital they gave him one and he had no more problems. Ronnie Gore now he has been having swelling at the corner of his lips, and weakness below the knees? Said these are symptoms of having low iron. Did he ask his pcp? Dr. Kasey Yee put him on Ferrous Sulfate 325 mg, but it is not working. Ronnie Gore he is not absorbing it. So, he wants you to order iron infusion. I said I would ask and if there is something stronger maybe Dr. Ta Anderson could prescribe? He said good, wants it sent to [facility]. Please advise.

## 2022-07-22 NOTE — TELEPHONE ENCOUNTER
Advised pt of doctors response. He will not order an infusion at any of those locations. He can ask urgent care if they will do so, however for medicare to cover it would need to come from his pcp. He said he will discuss with Dr. Elizabeth Escamilla.

## 2022-07-22 NOTE — TELEPHONE ENCOUNTER
Called pt and advised of doctors response. He said he wants to know if Dr. Ta Anderson could send him to a local clinic in [place] to have this done. He said it is expensive, but we would need to get insurance approval.  Told him I do not think this is something you would do. Asked if he has discussed this with his pcp and see if he would write an order for iron infusion.   He said he wanted to ask his \"best\" doctor first.    Lupe at [address] [place] [address] Infusion Services

## 2022-08-03 ENCOUNTER — TELEPHONE (OUTPATIENT)
Dept: FAMILY MEDICINE CLINIC | Age: 72
End: 2022-08-03

## 2022-08-03 DIAGNOSIS — R68.2 DRY MOUTH: ICD-10-CM

## 2022-08-03 DIAGNOSIS — R94.5 NONSPECIFIC ABNORMAL RESULTS OF LIVER FUNCTION STUDY: ICD-10-CM

## 2022-08-03 DIAGNOSIS — E78.1 PURE HYPERGLYCERIDEMIA: ICD-10-CM

## 2022-08-03 DIAGNOSIS — N52.01 ERECTILE DYSFUNCTION DUE TO ARTERIAL INSUFFICIENCY: ICD-10-CM

## 2022-08-03 DIAGNOSIS — M62.81 MUSCLE WEAKNESS (GENERALIZED): ICD-10-CM

## 2022-08-03 DIAGNOSIS — K90.89 OTHER INTESTINAL MALABSORPTION: ICD-10-CM

## 2022-08-03 DIAGNOSIS — R79.83 HOMOCYSTEINEMIA: ICD-10-CM

## 2022-08-03 DIAGNOSIS — R10.11 ABDOMINAL PAIN, RIGHT UPPER QUADRANT: ICD-10-CM

## 2022-08-03 DIAGNOSIS — R35.1 NOCTURIA: ICD-10-CM

## 2022-08-03 DIAGNOSIS — I10 ESSENTIAL HYPERTENSION: Primary | ICD-10-CM

## 2022-08-03 DIAGNOSIS — R20.9 DISTURBANCE OF SKIN SENSATION: ICD-10-CM

## 2022-08-03 NOTE — TELEPHONE ENCOUNTER
Pt called and left a message. He wants his lab orders sent to [address].  He said you sent them to [facility]. I looked into the [facility] account- he doesn't have anything pending in there.   Orders pended, please review, attach diagnosis, approve or deny

## 2022-08-15 ENCOUNTER — TELEPHONE (OUTPATIENT)
Dept: FAMILY MEDICINE CLINIC | Age: 72
End: 2022-08-15

## 2022-08-15 NOTE — TELEPHONE ENCOUNTER
Pt cannot get his my chart open. Wants to know labs, he feels weak, bp 126/63 , weight is now 166. Fariba Elaine he has had trouble walking, gets tired, cannot walk 1 block. Said he gets pain in both legs at night, but he wants to sleep all day and not work. Wants a referral to [facility] for a hematologist or wherever Dr. Wiliam Ling recommends in the [facility] system. Or a cardiologist if this is better. Also said its ok to fax a copy of your letter and the lab results to Dr. Scott Lemon. Please advise whom you would like him to see. Fariba Elaine you are faster than Dr Scott Lemon and he wants treatment asap. Doesn't like feeling this way.

## 2022-08-31 ENCOUNTER — TELEPHONE (OUTPATIENT)
Dept: FAMILY MEDICINE CLINIC | Age: 72
End: 2022-08-31

## 2022-08-31 NOTE — TELEPHONE ENCOUNTER
Pt tried to get in with Dr. Landy Matos, he has been on vacation. His legs have given out, he can't walk. Wanted to know if it is ok for him to go to the ER? I said yes. If its that bad go. That is what he will tell them, his doctor told him to go to er. He if afraid it is his heart. He also spoke to the pharmacist regarding his iron. She made sure he is taking iron sulfate 27 mg. Said this type will absorb better.

## 2022-09-01 ENCOUNTER — TELEPHONE (OUTPATIENT)
Dept: FAMILY MEDICINE CLINIC | Age: 72
End: 2022-09-01

## 2022-09-01 NOTE — TELEPHONE ENCOUNTER
Dr. Maira Charles (who graduated from [facility]) is a physician @ [facility] for [place] [facility]. He knows Joce has complete trust in Dr Radha Welsh. He is suggesting with the patients symptomatic anemia, there is no incorporated care for his pancreatic/colon cancer and wonders if you would be willing to discuss his care? Called back Dr. Danae Powell, (per Dr. Carmelina Barbour instructions) and gave Dr. Carmelina Barbour cell number to call him.

## 2022-09-01 NOTE — TELEPHONE ENCOUNTER
Just received a call from Slava Williamson NP for GI. She wants us to know that this patient has been diagnosed with colon cancer in January of 2021, and that this patient doesn't believe them. She wanted to know what Dr. Ta Anderson was going to do. I advised that Dr. Ta Anderson is not the patients PCP, that is it Dr. Kasey Yee, however he is on vacation at this time. She asked what type of doctor is Dr. Ta Anderson? Advised holistic, a nutragenomic specialist.  Advised we are in contact with the patient and do know how he is. She thanked me and hung up.

## 2022-09-01 NOTE — TELEPHONE ENCOUNTER
Pt is in hospital now, said they are keeping him until tomorrow- maybe. They wanted to give him a blood transfusion, however, after they told him the possible risks he refused. Said he doesn't want aids, mental problems, or sickness from anyone else. Elle Beltran he has a good doctor and he refuses all blood transfusions. He did accept an iron transfusion. Also they did find a blockage from one of his arteries and is not sure what they are going to do about that, they have to talk. Will keep doctor updated, call back tomorrow.   Please review his chart in [facility].

## 2022-10-06 ENCOUNTER — OFFICE VISIT (OUTPATIENT)
Dept: FAMILY MEDICINE CLINIC | Age: 72
End: 2022-10-06
Payer: MEDICARE

## 2022-10-06 VITALS
TEMPERATURE: 98.2 F | DIASTOLIC BLOOD PRESSURE: 72 MMHG | OXYGEN SATURATION: 99 % | HEART RATE: 86 BPM | RESPIRATION RATE: 10 BRPM | HEIGHT: 68 IN | SYSTOLIC BLOOD PRESSURE: 120 MMHG | WEIGHT: 163.6 LBS | BODY MASS INDEX: 24.8 KG/M2

## 2022-10-06 DIAGNOSIS — E78.1 PURE HYPERGLYCERIDEMIA: ICD-10-CM

## 2022-10-06 DIAGNOSIS — N52.01 ERECTILE DYSFUNCTION DUE TO ARTERIAL INSUFFICIENCY: ICD-10-CM

## 2022-10-06 DIAGNOSIS — M62.81 MUSCLE WEAKNESS (GENERALIZED): ICD-10-CM

## 2022-10-06 DIAGNOSIS — K90.89 OTHER INTESTINAL MALABSORPTION: ICD-10-CM

## 2022-10-06 DIAGNOSIS — R35.1 NOCTURIA: ICD-10-CM

## 2022-10-06 DIAGNOSIS — E55.9 VITAMIN D DEFICIENCY: ICD-10-CM

## 2022-10-06 DIAGNOSIS — R73.09 LOW GLUCOSE LEVEL: ICD-10-CM

## 2022-10-06 DIAGNOSIS — I10 ESSENTIAL HYPERTENSION: Primary | ICD-10-CM

## 2022-10-06 DIAGNOSIS — E78.5 ELEVATED LIPIDS: ICD-10-CM

## 2022-10-06 DIAGNOSIS — R79.83 HOMOCYSTEINEMIA: ICD-10-CM

## 2022-10-06 DIAGNOSIS — E86.0 DEHYDRATION: ICD-10-CM

## 2022-10-06 DIAGNOSIS — R10.11 ABDOMINAL PAIN, RIGHT UPPER QUADRANT: ICD-10-CM

## 2022-10-06 DIAGNOSIS — C84.00 MYCOSIS FUNGOIDES, UNSPECIFIED BODY REGION (HCC): ICD-10-CM

## 2022-10-06 PROBLEM — I65.02 STENOSIS OF LEFT VERTEBRAL ARTERY: Status: ACTIVE | Noted: 2022-09-01

## 2022-10-06 PROBLEM — R42 LIGHTHEADEDNESS: Status: ACTIVE | Noted: 2022-09-01

## 2022-10-06 PROCEDURE — 1123F ACP DISCUSS/DSCN MKR DOCD: CPT | Performed by: FAMILY MEDICINE

## 2022-10-06 PROCEDURE — 3017F COLORECTAL CA SCREEN DOC REV: CPT | Performed by: FAMILY MEDICINE

## 2022-10-06 PROCEDURE — G8427 DOCREV CUR MEDS BY ELIG CLIN: HCPCS | Performed by: FAMILY MEDICINE

## 2022-10-06 PROCEDURE — G8420 CALC BMI NORM PARAMETERS: HCPCS | Performed by: FAMILY MEDICINE

## 2022-10-06 PROCEDURE — 99215 OFFICE O/P EST HI 40 MIN: CPT | Performed by: FAMILY MEDICINE

## 2022-10-06 PROCEDURE — 1036F TOBACCO NON-USER: CPT | Performed by: FAMILY MEDICINE

## 2022-10-06 PROCEDURE — G8484 FLU IMMUNIZE NO ADMIN: HCPCS | Performed by: FAMILY MEDICINE

## 2022-10-06 RX ORDER — DOXYCYCLINE HYCLATE 100 MG
100 TABLET ORAL 2 TIMES DAILY
Qty: 14 TABLET | Refills: 2 | Status: SHIPPED | OUTPATIENT
Start: 2022-10-06 | End: 2022-10-13

## 2022-10-06 RX ORDER — HYDROCHLOROTHIAZIDE 25 MG/1
25 TABLET ORAL DAILY
Qty: 180 TABLET | Refills: 3 | Status: SHIPPED | OUTPATIENT
Start: 2022-10-06

## 2022-10-06 RX ORDER — LACTOSE-REDUCED FOOD
1 LIQUID (ML) ORAL 2 TIMES DAILY
Qty: 24 EACH | Refills: 6 | Status: SHIPPED | OUTPATIENT
Start: 2022-10-06

## 2022-10-06 RX ORDER — SIMVASTATIN 10 MG
10 TABLET ORAL NIGHTLY
Qty: 90 TABLET | Refills: 3 | Status: SHIPPED | OUTPATIENT
Start: 2022-10-06 | End: 2022-10-31 | Stop reason: SINTOL

## 2022-10-06 ASSESSMENT — PATIENT HEALTH QUESTIONNAIRE - PHQ9
2. FEELING DOWN, DEPRESSED OR HOPELESS: 0
SUM OF ALL RESPONSES TO PHQ QUESTIONS 1-9: 0
1. LITTLE INTEREST OR PLEASURE IN DOING THINGS: 0
SUM OF ALL RESPONSES TO PHQ QUESTIONS 1-9: 0
SUM OF ALL RESPONSES TO PHQ9 QUESTIONS 1 & 2: 0

## 2022-10-06 NOTE — PROGRESS NOTES
[id number] [facility] Niyah GARRISON [address] [address]  Dept: [phone]  Dept Fax: [phone]  Loc: [phone]      Elvin Willams is a 67 y.o. Black male. Shubham May  presents to the [place] [address] clinic today for   Chief Complaint   Patient presents with    Discuss Labs     Gi issues   , and;   1. Essential hypertension    2. Elevated lipids    3. Mycosis fungoides, unspecified body region ([address]    4. Homocysteinemia    5. Pure hyperglyceridemia    6. Muscle weakness (generalized)    7. Abdominal pain, right upper quadrant    8. Nocturia    9. Other intestinal malabsorption    10. Erectile dysfunction due to arterial insufficiency    11. Dehydration    12. Vitamin D deficiency    13. Low glucose level          I have reviewed [facility] medical, surgical and other pertinent history in detail, and have updated medication and allergy information in the computerized patient record. Clinical Care Team:     -Referring Provider for today's consult: self  -Primary Care Provider: Sunday Curran MD    Medical/Surgical History:   He  has a past medical history of Anorgasmia, Chest pain, Hemoptysis, Hemorrhoids, internal, with bleeding, Hypertension, LBP (low back pain), LFTs abnormal, Neuropathy, Paresthesia of left arm, and Pure hyperglyceridemia. His  has no past surgical history on file. Family/Social History:     His family history includes High Blood Pressure in his father; Stroke in his father. He  reports that he has never smoked. He has never used smokeless tobacco. He reports that he does not drink alcohol and does not use drugs.     Medications/Allergies/Immunizations:     His current medication(s) include   Current Outpatient Medications:     doxycycline hyclate (VIBRA-TABS) 100 MG tablet, Take 1 tablet by mouth 2 times daily for 7 days, Disp: 14 tablet, Rfl: 2    Magnesium 400 MG CAPS, Take 1 capsule by mouth 4 times daily (after meals and at bedtime), Disp: 120 capsule, Rfl: 5    Ferrous Gluconate 239 (27 Fe) MG TABS, Take 1 tablet by mouth nightly, Disp: 90 tablet, Rfl: 1    Nutritional Supplements (ENSURE CLEAR) LIQD, Take 1 Can by mouth 2 times daily, Disp: 24 each, Rfl: 6    simvastatin (ZOCOR) 10 MG tablet, Take 1 tablet by mouth nightly, Disp: 90 tablet, Rfl: 3    hydroCHLOROthiazide (HYDRODIURIL) 25 MG tablet, Take 1 tablet by mouth daily At breakfast and dinner if leg or foot swollen, Disp: 180 tablet, Rfl: 3    baclofen (LIORESAL) 10 MG tablet, Take 1 tablet by mouth 3 times daily, Disp: 30 tablet, Rfl: 1    Magnesium Chloride-Calcium  MG, Take 1 tablet by mouth in the morning, at noon, and at bedtime, Disp: 90 tablet, Rfl: 3    tamsulosin (FLOMAX) 0.4 MG capsule, Take 0.4 mg by mouth every other day, Disp: , Rfl:     potassium chloride (KLOR-CON M) 10 MEQ extended release tablet, Take 1 tablet by mouth daily, Disp: 90 tablet, Rfl: 1    Specialty Vitamins Products (MAGNESIUM, AMINO ACID CHELATE,) 133 MG tablet, Take 1 tablet by mouth 4 times daily (with meals and nightly), Disp: 120 tablet, Rfl: 6    B Complex-Biotin-FA (B-100 TR) TBCR, Take 1 tablet by mouth 2 times daily (before meals) CVS B-100 Time released balanced B complex tablet, Disp: 60 tablet, Rfl: 6    penicillin v potassium (VEETID) 500 MG tablet, TAKE 1 TABLET BY MOUTH 3 TIMES DAILY, Disp: 100 tablet, Rfl: 3    Ascorbic Acid  MG TBCR, Take 1 tablet by mouth 4 times daily (before meals and nightly), Disp: 120 tablet, Rfl: 6    Lysine 500 MG TABS, Take 1 tablet by mouth 4 times daily (before meals and nightly), Disp: 120 tablet, Rfl: 6    vitamin D3 (CHOLECALCIFEROL) 25 MCG (1000 UT) TABS tablet, Take 2 tablets by mouth daily, Disp: 60 tablet, Rfl: 6    Vitamin A 4.5 MG (77994 UT) TABS, Take 1 tablet by mouth daily, Disp: 30 tablet, Rfl: 6    sildenafil (VIAGRA) 100 MG tablet, TAKE 1 TABLET BY MOUTH APPROXIMATELY 1 HOUR BEFORE SEXUAL ACTIVITY. DO NOT USE MORE THAN 1 DOSE DAILY (Patient not taking: Reported on 4/21/2022), Disp: 2 tablet, Rfl: 3    acetaminophen (TYLENOL) 500 MG tablet, Take 500 mg by mouth 2 times daily, Disp: , Rfl:     Omega-3 Fatty Acids (FISH OIL) 1000 MG CAPS, Take 3 capsules by mouth 3 times daily CVS pharmaceutical grade, Disp: 90 capsule, Rfl: 5    Multiple Minerals-Vitamins (DINAH-MAG-ZINC-D) TABS, Take 1 tablet by mouth 4 times daily (before meals and nightly) (Patient not taking: Reported on 4/21/2022), Disp: 120 tablet, Rfl: 5    Cyanocobalamin (VITAMIN B 12 PO), Take 2 each by mouth daily  (Patient not taking: Reported on 4/21/2022), Disp: , Rfl:   Allergies: Ibuprofen  Immunizations:   Immunization History   Administered Date(s) Administered    COVID-19, MODERNA BLUE border, Primary or Immunocompromised, (age 12y+), IM, 100 mcg/0.5mL 01/15/2021, 02/12/2021, 10/27/2021    COVID-19, MODERNA Bivalent BOOSTER, (age 18y+), IM, 50 mcg/0.5 mL 09/23/2022    Influenza A (Z0K0-22) Vaccine PF IM 12/15/2009    Influenza Vaccine, unspecified formulation 11/21/2013, 11/29/2017    Influenza Virus Vaccine 11/21/2013, 11/12/2014, 11/24/2015, 11/29/2017    Influenza Whole 09/28/2010    Influenza, FLUAD, (age 72 y+), Adjuvanted, 0.5mL 09/26/2020    Influenza, FLUZONE (age 72 y+), High Dose, 0.7mL 11/29/2021, 09/20/2022    PPD Test 05/16/2011        History of Present Illness:     Goitom's had concerns including Discuss Labs (Gi issues). Juli Thomas  presents to the [address] [place] [address] today for;   Chief Complaint   Patient presents with    Discuss Labs     Gi issues   , abnormal labs follow up and these conditions as he  Is looking today for:     1. Essential hypertension    2. Elevated lipids    3. Mycosis fungoides, unspecified body region ([address]    4. Homocysteinemia    5. Pure hyperglyceridemia    6. Muscle weakness (generalized)    7. Abdominal pain, right upper quadrant    8. Nocturia    9.  Other intestinal malabsorption    10. Erectile dysfunction due to arterial insufficiency    11. Dehydration    12. Vitamin D deficiency    13. Low glucose level      HPI    Subjective:     Review of Systems   All other systems reviewed and are negative. Objective:     /72 (Site: Left Upper Arm, Position: Sitting, Cuff Size: Medium Adult)   Pulse 86   Temp 98.2 °F (36.8 °C) (Oral)   Resp 10   Ht 5' 8.27\" (1.734 m)   Wt 163 lb 9.6 oz (74.2 kg)   SpO2 99%   BMI 24.68 kg/m²   Physical Exam  Vitals and nursing note reviewed. Constitutional:       Appearance: Normal appearance. HENT:      Head: Normocephalic. Pulmonary:      Effort: Pulmonary effort is normal.   Neurological:      Mental Status: He is alert. Psychiatric:         Mood and Affect: Mood normal.         Thought Content: Thought content normal.          Laboratory Data:   Lab results were searched in Care Everywhere and/or those brought by the pateint were reviewed today with Joce and he has a copy of their most recent labs to take home with them as noted below;       Imaging Data:   Imaging Data:       Assessment & Plan:       Impression:  1. Essential hypertension    2. Elevated lipids    3. Mycosis fungoides, unspecified body region ([address]    4. Homocysteinemia    5. Pure hyperglyceridemia    6. Muscle weakness (generalized)    7. Abdominal pain, right upper quadrant    8. Nocturia    9. Other intestinal malabsorption    10. Erectile dysfunction due to arterial insufficiency    11. Dehydration    12. Vitamin D deficiency    13. Low glucose level      Assessment and Plan:  After reviewing the patients chief complaints, reviewing their labfindings in great detail (with the patient and those accompanying them) which correlate to their chief complaints, symptoms, and or medical conditions; suggestions were made relating to changes in diet and or supplements which may improve the complaints and which will be reflected in their future lab findings;   Chief Complaint   Patient presents with    Discuss Labs     Gi issues   ;    Plans for the next visits:  - Abnormal and non-optimal Labs were ordered today to be repeated in the next 120-365 days to assess changes from adjustments in nutrition and or nutrients. - Patient instructed when having a blood draw to ask the  to divide their lab draws into multiple draws over several days if not feeling good at the time of the lab draw or if either prefers to do several smaller blood draws over several days  -Patient instructed to check with insurer before each lab draw and to go to the lab which the insurer directs them for the most cost effective lab draw with the least patient's cost  - Goitom  will be scheduled subsequent to those results. - Maria R Fernando will bring in his drink, food, supplement log to his next visit    Chronic Problems Addressed on this Visit:                                   1.  Intensity of Service; Uncontrolled items at this visit; Chief Complaint   Patient presents with    Discuss Labs     Gi issues   ; Improved items at this visit and Stable items were discussed at this visit;  2. Patients food, drinks, supplements and symptoms were reviewed with the patient,       - Goito will bring food, drink, supplements and symptoms log to next visit for inclusion in their record      - 75 better food list reviewed & given to patient with the omega 6 food list to avoid      - The 52 Latex foods list was reviewed and given to the patients with the information on carrageenan         - Gluten in corn and oats abstracts sheet reviewed and given to the patient today   3.    Greater than 40 minutes time was spent with the patient face to face on this visit; of which >50% was for counseling and coordination of care, as well as the time spent before and after the visit reviewing the chart, documenting the encounter, reviewing labs,reports, NIH listed studies, making phone calls, etc.      Patients food and drinks were reviewed with the patient,   - They will bring a food drink symptom log to future visits for inclusion in their record    - 75 better food list reviewed & given to patient along with the omega 6 food list to avoid      - Gluten in corn and oats abstracts sheet reviewed and given to the patient today    - 23 Foods containing Latex-like proteins was reviewed and copy to be taken if desired     - Nutrient Supplements list provided and copyto be taken if desired    - Qprzwepohyxzab398qmew. [employer] web site offered to patient to review at their convenience by staff with login information    Note:  I have discussed with the patient that with all nutraceuticals, there is often mixed data and emerging research which needs to be monitored; as well as an array of NIH fact sheets on nutrients and supplements, available at www.nih,issue plus Jobspotting. [employer] plus www.Zakaz.ua,org. If I have recommended cinnamon at the request of this patient to assist them in control of their blood sugar, triglyceride, and/or weight issues. I discussed that the patient's clinical use of cinnamon bark, calcium, magnesium, Vitamin D, and pharmaceutical grade CVS omega 3 oil or triple-strength fish oil, and B-50/B-100 time-released B-complex by [address] [facility] will be for a time-limited trial to determine their individual effectiveness and safety in this patient. I also referred the patient to the NMCD: Nutrition, Metabolism, and Cardiovascular Diseases (SecuritiesCard.pl) and concerns about long-term use and hepatotoxicity of cinnamon and other nutrients. I suggested they frequently search nih.gov for the latest non-proprietary information on nutriceuticals as well as consider a subscription to [employer] for details on reviewed supplements, or at the least review the nutrient files at [facility] at [facility], [address] bark, an insulin mimetic, reduces some High Carbohydrate Dietary Impacts. Methylhydroxychalcone polymers insulin-enhancing properties in fat cells are responsible for enhanced glucose uptake, inhibiting hepatic HMG-CoA reductase and lowers lipids. www.jacn. org/content/20/4/327.full     But cinnamon with additives such as Cinnamon Extract are not effective as insulin mimetics.  :eStoreDirectory.at     Nutrients for Start up from [employer] or [employer] for ease to get started now;  Robbie Lerma has some useable products;  - Triple Strength Fish Oil, enteric coated  - Vit D-3 5000 IU gel caps  - Iron ferrous sulfate 325 mg tabs  - Centrum Silver look-a-like for most patients, or  - Centrum plain look-a-like if need iron    Local pharmacies or chains such as [employer], have:  - T[employer]elligent pharmaceutical grade omega 3 is 90% EPA/DHA whereas most Triple strength fish oil are 75% EPA/DHA  - Triple Strength Fish Oil (enteric coated if available) or if not enteric coated, can take from freezer for less burps  - B-50 or B-100 released balanced B complex tabs by [address] [facility] bark 500 mg (without Chromium or extracts)   some brands list 1000 mg / serving of 2 capsules,    some brands have 1000 mg caps with the undesireable chromium extract  - Calcium carbonate/citrate, magnesium oxide/citrate, Vit D-3 as 3-4 tabs/caps/serving     Some Local Brands may contain Zinc which is acceptable for the first bottle or two  - Magnesium oxide 250 mg tabs for those having < 2 bowel movements daily  - Magnesium citrate 200 mg if having > 2 bowel movements/day  - Centrum Silver or look-a-like for most patients, Centrum plain or look-a-like with iron  - Vitamin D-3 comes as 1,000 IU or 2,000 IU or 5,000 IU gel caps or Liquid drops but keep Vitamin D levels <50 but >40     Some brands containing or derived from soy oil or corn oil are OK if not allergic to soy  - Elemental Iron 65 mg tabs at bedtime is available over the counter if need more iron     Usually turns bowel movements grey, green, or black but not a concern  - Apricot Kernel Oil (by Now) for dry skin sensitive perineal or perianal area skin    Nutrients for ongoing use by Mail order for less expense from [employer] ;  - Strength Fish Oil , 240 Softgels Item #[id number]  -B-100 time released balanced B complex Item #[id number]  - Cinnamon bark 500 mg without Chromium or extract Item #[id number]  - Calcium carbonate 1000 mg, Magnesium oxide 500 mg, Vit D-3 400 IU Item #[id number]  - Magnesium oxide 500 mg tabs Item #[id number] if less than 2 bowel movements daily  - [employer] Item #[id number] for most patients, One Daily Item #894449 with iron  - Vit D 3  1,000 Item #[id number]      2,000 IU Item #[id number]   Item #180795     Some brands containing orderived from soy oil or corn oil are OK if not allergic to soy    Nutrients for Special Needs by Mail order for less expense from www. puritan.com;  -Elemental Iron 65 mg tabs Item #238715 if need more iron for low iron on labs    Usually turns bowel movements grey, green or black but not a concern  - Time released Niacin 250 mg Item #745112 for cold intolerance, low libido or impotence  - DHEA 50 mg Item #950225 for improving DHEA levels on labs if having Fatigue    If stools too loose substitute for your Magnesium oxide using;   Magnesium citrate 200 mg tabs (NOT liquid) at [employer]   Magnesium gluconate 550 mg by Gloria Choi at [employer] or [employer]  Magnesium chloride foot soaks or body sprays  www.Omaze. [employer]   Magnesium chloride flakes 14.99 Item #: [id number] if back-ordered, get spray  Magnesium threonate, Magtein also helps mental clarity and sleep    Food Drink Symptom Log;  I asked this patient to track these items and any other symptoms on their list on a weekly basis to documenttheir progress or lack of same.  This can be done on the symptom tracking sheet I gave them at today's visit but looks like this: Rate on scale of 0-10 with zero = not noticeable  Symptom:                            Week 1               2                 3                 4               Etc            Hair loss    Foot cramps    Paresthesia    Aches    IBS (irritable bowel)    Constipation    Diarrhea  Nocturia (up to bathroom at night)    Fatigue/Energy level  Stress      On the other side of the sheet they can track their food, drink, environment, activity, symptoms etc      Avoiding Latex-like proteins in my foods; Avocados, Bananas, Celery, Figs & Kiwi proteins have latex-like proteins to inflame our immune systems, plus 47 more foods  How Can I Have A Latex Allergy? Eating foods with latex-like protein exposes us to latex allergies. Our body cannot tell the differencebetween these latex-like proteins and latex from rubber products since many people are allergic to fruit, vegetables and latex. Read labels on pre-packaged foods. This list to avoid is only a guide if you are known allergicto latex or have a latex rash on your chin, cheeks and lines on your neck and chest. The amount of latex is different in each food product or fruit variety. Avoid out of Season if not grown locally:   Melon, Nectarine, Papaya, Cherry, Passion fruit, Plum, Chestnuts, and Tomato. Avocado, Banana, Celery, Figs, and Kiwi always contain Latex-like protein. Whats in Season? Strawberries taste better in June than December because June is strawberry season so buy locally grown produce \"in season\" for the best flavor, cost, and less Latex. Locally grown produce not only tastes great but also requires little or no ethylene exposure in food distribution so has less latex content. Out of season: use canned, frozen, or dried since those are processed ripe and latex content is lower!!!     Month     [place] Locally Grown Produce  January, February, March: use canned, frozen or dried fruits since lower in latex  April: asparagus, radishes  May: asparagus, broccoli, green onions, greens, peas, radishes, rhubarb  June: asparagus, beets, beans, broccoli, cabbage, cantaloupe, carrots, green onions, greens, lettuce, onions, parsley, peas, radishes, rhubarb, strawberries, watermelons  July: beans, beets, blueberries, broccoli, cabbage, cantaloupe, carrots, cauliflower, celery, cucumbers, eggplant, grapes, green onions, greens, lettuce, onions, parsley, peas, peaches, bell peppers, potatoes, radishes, summer raspberries, squash, sweetcorn, tomatoes, turnips, watermelons  August: apples, beans, beets, blueberries, cabbage, cantaloupe, carrots, cauliflower, celery, cucumbers, eggplant, grapes, green onions, greens, lettuce, onions, parsley, peas, peaches, pears, bell peppers, potatoes, radishes, squash, sweet corn, tomatoes, turnips, watermelons  September: apples, beans, beets, blueberries, cabbage, cantaloupe, carrots, cauliflower, celery, cucumbers, eggplant, grapes, green onions, greens, lettuce, onions, parsley, peas, peaches, pears, bell peppers, plums, potatoes, pumpkins, radishes, fall red raspberries, squash, sweet corn, tomatoes, turnips, watermelons  October: apples, beets, broccoli, cabbage, carrots, cauliflower, celery, green onions, greens, lettuce, parsley, peas, pears, potatoes, pumpkins, radishes, fall red raspberries, squash, turnips  November: broccoli, cabbage, carrots, parsley, pears, peas  December: use canned, frozen or dried fruits since lower in latex    Upto half of latex-sensitive patients show allergic reactions to fruits (avocados, bananas, kiwifruits, papayas, peaches),   Annals of Allergy, 1994. These plants contain the same proteins that are allergens in latex. People with fruit allergies should warn physicians before undergoing procedures which may cause anaphylactic reaction if in contact with latex gloves.   Some of the common foods with defined cross-reactivity to latex are avocado, banana, kiwi, chestnut, raw potato, tomato, stone fruits (e.g., peach, cherry), hazelnut, melons, celery, carrot, apple, pear, papaya, and almond. Foods with less well-defined cross-reactivity to latex are peanuts, peppers, citrus fruits, coconut, pineapple, thom, fig, passion fruit, Ugli fruit, and grape. This fruit/latex cross-reactivity is worsened by ethylene, a gas used to hasten commercial ripening. In nature, plants produce low levels of the hormone ethylene, which regulates germination, flowering, and ripening. Forced ripening by high ethylene concentrations, plants produce allergenic wound-repair proteins, which are similar to wound-repair proteins made during the tapping of rubber trees. Sensitive individuals who ingest the fruit get a higher dose and worse reaction. Some people may even first become sensitized to latex through fruit. Can food processing increase the concentrations of allergenic proteins? Latex-sensitized children (and adults) in [place] often experience allergic reactions after eating bananas ripened artificially with ethylene. In [facility], food distribution centers treat unripe bananas and other produce with ethylene to ripen; not commonly done in [facility] since fruit is tree-ripened there. Does treatment of food with ethylene induce banana proteins that cross-react with latex? (Nic et al.)    References:   Latex in Foods Allergy, http://ehp.niehs.nih.gov/members/2003/5811/5811.html    Search web for [employer] in Season \" for where you live or are at the time you food shop   Management of Latex, ://[email or web address]. [email or web address].edu/  search for nih, latex-like proteins in foods

## 2022-10-31 ENCOUNTER — TELEPHONE (OUTPATIENT)
Dept: FAMILY MEDICINE CLINIC | Age: 72
End: 2022-10-31

## 2022-10-31 RX ORDER — LOVASTATIN 10 MG/1
10 TABLET ORAL NIGHTLY
Qty: 30 TABLET | Refills: 3 | Status: SHIPPED | OUTPATIENT
Start: 2022-10-31

## 2022-10-31 NOTE — TELEPHONE ENCOUNTER
Pt started to take the simvastatin on a regular basis, he now has shoulder pain bilat both sides. States it is muscle pain. Wants to know if there is something else he can try or something without side effects to take. Said we may be able to change to something else, its up to Dr. Yarelis Ruelas. Will call him back with whatever you decide.

## 2022-11-02 ENCOUNTER — TELEPHONE (OUTPATIENT)
Dept: FAMILY MEDICINE CLINIC | Age: 72
End: 2022-11-02

## 2022-11-02 NOTE — TELEPHONE ENCOUNTER
Advised pt of Dr Lucetta Habermann response. He doesn't like it, but he will go. Said he googled Dr. Gabrielle Mcnulty and he read it could be magnesium. Last time he went to hospital they told him to decrease the magnesium. He did. Now he said he will rule out the blood clot and if they say negative, he will go back on his original dosage of magnesium.

## 2022-11-02 NOTE — TELEPHONE ENCOUNTER
Pt is calling and lm. He wants me to check and see if there is any medication you can give him to help him walk. Said the leg pain has returned and wants to know if there is something you could do? Should I just tell him to go to ER? Or urgent care?

## 2023-01-03 ENCOUNTER — TELEPHONE (OUTPATIENT)
Dept: FAMILY MEDICINE CLINIC | Age: 73
End: 2023-01-03

## 2023-01-03 ENCOUNTER — NURSE ONLY (OUTPATIENT)
Dept: LAB | Age: 73
End: 2023-01-03

## 2023-01-03 ENCOUNTER — OFFICE VISIT (OUTPATIENT)
Dept: FAMILY MEDICINE CLINIC | Age: 73
End: 2023-01-03
Payer: MEDICARE

## 2023-01-03 VITALS
HEIGHT: 68 IN | OXYGEN SATURATION: 99 % | SYSTOLIC BLOOD PRESSURE: 126 MMHG | BODY MASS INDEX: 23.37 KG/M2 | RESPIRATION RATE: 10 BRPM | HEART RATE: 92 BPM | DIASTOLIC BLOOD PRESSURE: 64 MMHG | WEIGHT: 154.2 LBS | TEMPERATURE: 97.5 F

## 2023-01-03 DIAGNOSIS — E55.9 VITAMIN D DEFICIENCY: ICD-10-CM

## 2023-01-03 DIAGNOSIS — R35.1 NOCTURIA: ICD-10-CM

## 2023-01-03 DIAGNOSIS — C84.00 MYCOSIS FUNGOIDES, UNSPECIFIED BODY REGION (HCC): ICD-10-CM

## 2023-01-03 DIAGNOSIS — R79.83 HOMOCYSTEINEMIA: ICD-10-CM

## 2023-01-03 DIAGNOSIS — K90.89 OTHER INTESTINAL MALABSORPTION: ICD-10-CM

## 2023-01-03 DIAGNOSIS — R79.9 ABNORMAL FINDING OF BLOOD CHEMISTRY: ICD-10-CM

## 2023-01-03 DIAGNOSIS — E78.1 PURE HYPERGLYCERIDEMIA: ICD-10-CM

## 2023-01-03 DIAGNOSIS — E78.5 ELEVATED LIPIDS: Primary | ICD-10-CM

## 2023-01-03 DIAGNOSIS — E86.0 DEHYDRATION: ICD-10-CM

## 2023-01-03 DIAGNOSIS — E72.11 HOMOCYSTINURIA (HCC): ICD-10-CM

## 2023-01-03 DIAGNOSIS — R20.9 DISTURBANCE OF SKIN SENSATION: ICD-10-CM

## 2023-01-03 DIAGNOSIS — R73.09 LOW GLUCOSE LEVEL: ICD-10-CM

## 2023-01-03 DIAGNOSIS — M62.81 MUSCLE WEAKNESS (GENERALIZED): ICD-10-CM

## 2023-01-03 DIAGNOSIS — E78.5 ELEVATED LIPIDS: ICD-10-CM

## 2023-01-03 DIAGNOSIS — R10.11 ABDOMINAL PAIN, RIGHT UPPER QUADRANT: ICD-10-CM

## 2023-01-03 DIAGNOSIS — I10 ESSENTIAL HYPERTENSION: ICD-10-CM

## 2023-01-03 DIAGNOSIS — R94.5 NONSPECIFIC ABNORMAL RESULTS OF LIVER FUNCTION STUDY: ICD-10-CM

## 2023-01-03 DIAGNOSIS — R68.2 DRY MOUTH: ICD-10-CM

## 2023-01-03 DIAGNOSIS — N52.01 ERECTILE DYSFUNCTION DUE TO ARTERIAL INSUFFICIENCY: ICD-10-CM

## 2023-01-03 LAB
ALBUMIN SERPL-MCNC: 3.8 G/DL (ref 3.5–5.1)
ALP BLD-CCNC: 57 U/L (ref 38–126)
ALT SERPL-CCNC: 7 U/L (ref 11–66)
ANION GAP SERPL CALCULATED.3IONS-SCNC: 15 MEQ/L (ref 8–16)
AST SERPL-CCNC: 20 U/L (ref 5–40)
BILIRUB SERPL-MCNC: 0.5 MG/DL (ref 0.3–1.2)
BILIRUBIN DIRECT: < 0.2 MG/DL (ref 0–0.3)
BUN BLDV-MCNC: 12 MG/DL (ref 7–22)
CALCIUM SERPL-MCNC: 9.3 MG/DL (ref 8.5–10.5)
CHLORIDE BLD-SCNC: 99 MEQ/L (ref 98–111)
CHOLESTEROL, TOTAL: 128 MG/DL (ref 100–199)
CO2: 25 MEQ/L (ref 23–33)
CREAT SERPL-MCNC: 0.5 MG/DL (ref 0.4–1.2)
GFR SERPL CREATININE-BSD FRML MDRD: > 60 ML/MIN/1.73M2
GLUCOSE BLD-MCNC: 83 MG/DL (ref 70–108)
HDLC SERPL-MCNC: 38 MG/DL
IRON: 11 UG/DL (ref 65–195)
LDL CHOLESTEROL CALCULATED: 74 MG/DL
MAGNESIUM: 2.3 MG/DL (ref 1.6–2.4)
POTASSIUM SERPL-SCNC: 3.2 MEQ/L (ref 3.5–5.2)
PROSTATE SPECIFIC ANTIGEN: 5.26 NG/ML (ref 0–1)
PTH INTACT: 42.9 PG/ML (ref 15–65)
RHEUMATOID FACTOR: < 10 IU/ML (ref 0–13)
SEDIMENTATION RATE, ERYTHROCYTE: 35 MM/HR (ref 0–10)
SODIUM BLD-SCNC: 139 MEQ/L (ref 135–145)
TOTAL IRON BINDING CAPACITY: 352 UG/DL (ref 171–450)
TOTAL PROTEIN: 7 G/DL (ref 6.1–8)
TRIGL SERPL-MCNC: 79 MG/DL (ref 0–199)
TSH SERPL DL<=0.05 MIU/L-ACNC: 1.52 UIU/ML (ref 0.4–4.2)
VITAMIN D 25-HYDROXY: 21 NG/ML (ref 30–100)

## 2023-01-03 PROCEDURE — 99213 OFFICE O/P EST LOW 20 MIN: CPT | Performed by: FAMILY MEDICINE

## 2023-01-03 PROCEDURE — 3074F SYST BP LT 130 MM HG: CPT | Performed by: FAMILY MEDICINE

## 2023-01-03 PROCEDURE — G8420 CALC BMI NORM PARAMETERS: HCPCS | Performed by: FAMILY MEDICINE

## 2023-01-03 PROCEDURE — G8484 FLU IMMUNIZE NO ADMIN: HCPCS | Performed by: FAMILY MEDICINE

## 2023-01-03 PROCEDURE — G8427 DOCREV CUR MEDS BY ELIG CLIN: HCPCS | Performed by: FAMILY MEDICINE

## 2023-01-03 PROCEDURE — 1123F ACP DISCUSS/DSCN MKR DOCD: CPT | Performed by: FAMILY MEDICINE

## 2023-01-03 PROCEDURE — 1036F TOBACCO NON-USER: CPT | Performed by: FAMILY MEDICINE

## 2023-01-03 PROCEDURE — 3017F COLORECTAL CA SCREEN DOC REV: CPT | Performed by: FAMILY MEDICINE

## 2023-01-03 PROCEDURE — 3078F DIAST BP <80 MM HG: CPT | Performed by: FAMILY MEDICINE

## 2023-01-03 SDOH — ECONOMIC STABILITY: FOOD INSECURITY: WITHIN THE PAST 12 MONTHS, THE FOOD YOU BOUGHT JUST DIDN'T LAST AND YOU DIDN'T HAVE MONEY TO GET MORE.: NEVER TRUE

## 2023-01-03 SDOH — ECONOMIC STABILITY: FOOD INSECURITY: WITHIN THE PAST 12 MONTHS, YOU WORRIED THAT YOUR FOOD WOULD RUN OUT BEFORE YOU GOT MONEY TO BUY MORE.: NEVER TRUE

## 2023-01-03 ASSESSMENT — PATIENT HEALTH QUESTIONNAIRE - PHQ9
2. FEELING DOWN, DEPRESSED OR HOPELESS: 0
1. LITTLE INTEREST OR PLEASURE IN DOING THINGS: 0
SUM OF ALL RESPONSES TO PHQ QUESTIONS 1-9: 0
SUM OF ALL RESPONSES TO PHQ9 QUESTIONS 1 & 2: 0
SUM OF ALL RESPONSES TO PHQ QUESTIONS 1-9: 0

## 2023-01-03 ASSESSMENT — SOCIAL DETERMINANTS OF HEALTH (SDOH): HOW HARD IS IT FOR YOU TO PAY FOR THE VERY BASICS LIKE FOOD, HOUSING, MEDICAL CARE, AND HEATING?: NOT VERY HARD

## 2023-01-03 NOTE — PROGRESS NOTES
[id number] [facility] [address] [address]  Dept: [phone]  Dept Fax: [phone]  Loc: [phone]      Wilner Godinez is a 67 y.o. Black male. Alejandra Retana  presents to the [place] [address] clinic today for   Chief Complaint   Patient presents with    Other     Hair issues    Fatigue     Trouble walking due to feeling weak, nausea, vomiting, low bp, fainting, heart palpitations   , and;   1. Elevated lipids    2. Essential hypertension    3. Homocysteinemia    4. Pure hyperglyceridemia    5. Muscle weakness (generalized)    6. Abdominal pain, right upper quadrant    7. Nocturia    8. Other intestinal malabsorption    9. Vitamin D deficiency    10. Low glucose level    11. Abnormal finding of blood chemistry    12. Mycosis fungoides, unspecified body region ([facility]    13. [facility]ocystinuria ([facility]          I have reviewed [facility] medical, surgical and other pertinent history in detail, and have updated medication and allergy information in the computerized patient record. Clinical Care Team:     -Referring Provider for today's consult: self  -Primary Care Provider: Khurram Barrera MD    Medical/Surgical History:   He  has a past medical history of Anorgasmia, Chest pain, Hemoptysis, Hemorrhoids, internal, with bleeding, Hypertension, LBP (low back pain), LFTs abnormal, Neuropathy, Paresthesia of left arm, and Pure hyperglyceridemia. His  has no past surgical history on file. Family/Social History:     His family history includes High Blood Pressure in his father; Stroke in his father. He  reports that he has never smoked. He has never used smokeless tobacco. He reports that he does not drink alcohol and does not use drugs.     Medications/Allergies/Immunizations:     His current medication(s) include   Current Outpatient Medications:     lovastatin (MEVACOR) 10 MG tablet, Take 1 tablet by mouth nightly, Disp: 30 tablet, Rfl: 3    Magnesium 400 MG CAPS, Take 1 capsule by mouth 4 times daily (after meals and at bedtime), Disp: 120 capsule, Rfl: 5    Ferrous Gluconate 239 (27 Fe) MG TABS, Take 1 tablet by mouth nightly, Disp: 90 tablet, Rfl: 1    Nutritional Supplements (ENSURE CLEAR) LIQD, Take 1 Can by mouth 2 times daily, Disp: 24 each, Rfl: 6    hydroCHLOROthiazide (HYDRODIURIL) 25 MG tablet, Take 1 tablet by mouth daily At breakfast and dinner if leg or foot swollen, Disp: 180 tablet, Rfl: 3    baclofen (LIORESAL) 10 MG tablet, Take 1 tablet by mouth 3 times daily, Disp: 30 tablet, Rfl: 1    Magnesium Chloride-Calcium  MG, Take 1 tablet by mouth in the morning, at noon, and at bedtime, Disp: 90 tablet, Rfl: 3    tamsulosin (FLOMAX) 0.4 MG capsule, Take 0.4 mg by mouth every other day, Disp: , Rfl:     potassium chloride (KLOR-CON M) 10 MEQ extended release tablet, Take 1 tablet by mouth daily, Disp: 90 tablet, Rfl: 1    Specialty Vitamins Products (MAGNESIUM, AMINO ACID CHELATE,) 133 MG tablet, Take 1 tablet by mouth 4 times daily (with meals and nightly), Disp: 120 tablet, Rfl: 6    B Complex-Biotin-FA (B-100 TR) TBCR, Take 1 tablet by mouth 2 times daily (before meals) CVS B-100 Time released balanced B complex tablet, Disp: 60 tablet, Rfl: 6    penicillin v potassium (VEETID) 500 MG tablet, TAKE 1 TABLET BY MOUTH 3 TIMES DAILY, Disp: 100 tablet, Rfl: 3    Ascorbic Acid  MG TBCR, Take 1 tablet by mouth 4 times daily (before meals and nightly), Disp: 120 tablet, Rfl: 6    Lysine 500 MG TABS, Take 1 tablet by mouth 4 times daily (before meals and nightly), Disp: 120 tablet, Rfl: 6    vitamin D3 (CHOLECALCIFEROL) 25 MCG (1000 UT) TABS tablet, Take 2 tablets by mouth daily, Disp: 60 tablet, Rfl: 6    Vitamin A 4.5 MG (02340 UT) TABS, Take 1 tablet by mouth daily, Disp: 30 tablet, Rfl: 6    sildenafil (VIAGRA) 100 MG tablet, TAKE 1 TABLET BY MOUTH APPROXIMATELY 1 HOUR BEFORE SEXUAL ACTIVITY.  DO NOT USE MORE THAN 1 DOSE DAILY (Patient not taking: Reported on 4/21/2022), Disp: 2 tablet, Rfl: 3    acetaminophen (TYLENOL) 500 MG tablet, Take 500 mg by mouth 2 times daily, Disp: , Rfl:     Omega-3 Fatty Acids (FISH OIL) 1000 MG CAPS, Take 3 capsules by mouth 3 times daily CVS pharmaceutical grade, Disp: 90 capsule, Rfl: 5    Multiple Minerals-Vitamins (DINAH-MAG-ZINC-D) TABS, Take 1 tablet by mouth 4 times daily (before meals and nightly) (Patient not taking: Reported on 4/21/2022), Disp: 120 tablet, Rfl: 5    Cyanocobalamin (VITAMIN B 12 PO), Take 2 each by mouth daily  (Patient not taking: Reported on 4/21/2022), Disp: , Rfl:   Allergies: Ibuprofen  Immunizations:   Immunization History   Administered Date(s) Administered    COVID-19, MODERNA BLUE border, Primary or Immunocompromised, (age 12y+), IM, 100 mcg/0.5mL 01/15/2021, 02/12/2021, 10/27/2021    COVID-19, MODERNA Bivalent BOOSTER, (age 12y+), IM, 50 mcg/0.5 mL 09/23/2022    Influenza A (I2O8-42) Vaccine PF IM 12/15/2009    Influenza Vaccine, unspecified formulation 11/21/2013, 11/29/2017    Influenza Virus Vaccine 11/21/2013, 11/12/2014, 11/24/2015, 11/29/2017    Influenza Whole 09/28/2010    Influenza, FLUAD, (age 72 y+), Adjuvanted, 0.5mL 09/26/2020    Influenza, FLUZONE (age 72 y+), High Dose, 0.7mL 11/29/2021, 09/20/2022    PPD Test 05/16/2011        History of Present Illness:     Goitom's had concerns including Other (Hair issues) and Fatigue (Trouble walking due to feeling weak, nausea, vomiting, low bp, fainting, heart palpitations). Seema Garnica  presents to the [address] [place] [address] today for;   Chief Complaint   Patient presents with    Other     Hair issues    Fatigue     Trouble walking due to feeling weak, nausea, vomiting, low bp, fainting, heart palpitations   , abnormal labs follow up and these conditions as he  Is looking today for:     1. Elevated lipids    2. Essential hypertension    3. Homocysteinemia    4.  Pure hyperglyceridemia    5. Muscle weakness (generalized)    6. Abdominal pain, right upper quadrant    7. Nocturia    8. Other intestinal malabsorption    9. Vitamin D deficiency    10. Low glucose level    11. Abnormal finding of blood chemistry    12. Mycosis fungoides, unspecified body region ([address]    13. Homocystinuria ([address]      HPI    Subjective:     Review of Systems   All other systems reviewed and are negative. Objective:     /64 (Site: Right Upper Arm, Position: Sitting, Cuff Size: Medium Adult)   Pulse 92   Temp 97.5 °F (36.4 °C) (Temporal)   Resp 10   Ht 5' 8.27\" (1.734 m)   Wt 154 lb 3.2 oz (69.9 kg)   SpO2 99%   BMI 23.26 kg/m²   Physical Exam  Vitals and nursing note reviewed. Constitutional:       Appearance: Normal appearance. HENT:      Head: Normocephalic. Cardiovascular:      Heart sounds: Murmur heard. Pulmonary:      Effort: Pulmonary effort is normal.      Breath sounds: No stridor. No wheezing or rales. Neurological:      Mental Status: He is alert. Psychiatric:         Mood and Affect: Mood normal.         Thought Content: Thought content normal.          Laboratory Data:   Lab results were searched in Care Everywhere and/or those brought by the pateint were reviewed today with Joce and he has a copy of their most recent labs to take home with them as noted below;       Imaging Data:   Imaging Data:       Assessment & Plan:       Impression:  1. Elevated lipids    2. Essential hypertension    3. Homocysteinemia    4. Pure hyperglyceridemia    5. Muscle weakness (generalized)    6. Abdominal pain, right upper quadrant    7. Nocturia    8. Other intestinal malabsorption    9. Vitamin D deficiency    10. Low glucose level    11. Abnormal finding of blood chemistry    12. Mycosis fungoides, unspecified body region ([address]    13.  Homocystinuria ([address]      Assessment and Plan:  After reviewing the patients chief complaints, reviewing their labfindings in great detail (with the patient and those accompanying them) which correlate to their chief complaints, symptoms, and or medical conditions; suggestions were made relating to changes in diet and or supplements which may improve the complaints and which will be reflected in their future lab findings; Chief Complaint   Patient presents with    Other     Hair issues    Fatigue     Trouble walking due to feeling weak, nausea, vomiting, low bp, fainting, heart palpitations   ;    Plans for the next visits:  - Abnormal and non-optimal Labs were ordered today to be repeated in the next 120-365 days to assess changes from adjustments in nutrition and or nutrients. - Patient instructed when having a blood draw to ask the  to divide their lab draws into multiple draws over several days if not feeling good at the time of the lab draw or if either prefers to do several smaller blood draws over several days  -Patient instructed to check with insurer before each lab draw and to go to the lab which the insurer directs them for the most cost effective lab draw with the least patient's cost  - Goitom  will be scheduled subsequent to those results. - Geronimo Rosario will bring in his drink, food, supplement log to his next visit    Chronic Problems Addressed on this Visit:                                   1.  Intensity of Service; Uncontrolled items at this visit; Chief Complaint   Patient presents with    Other     Hair issues    Fatigue     Trouble walking due to feeling weak, nausea, vomiting, low bp, fainting, heart palpitations   ; Improved items at this visit and Stable items were discussed at this visit;  2.  Patients food, drinks, supplements and symptoms were reviewed with the patient,       - Goitom will bring food, drink, supplements and symptoms log to next visit for inclusion in their record      - 75 better food list reviewed & given to patient with the omega 6 food list to avoid      - The 52 Latex foods list was reviewed and given to the patients with the information on carrageenan         - Gluten in corn and oats abstracts sheet reviewed and given to the patient today   3. Greater than 20 minutes time was spent with the patient face to face on this visit; of which >50% was for counseling and coordination of care, as well as the time spent before and after the visit reviewing the chart, documenting the encounter, reviewing labs,reports, NIH listed studies, making phone calls, etc.      Patients food and drinks were reviewed with the patient,   - They will bring a food drink symptom log to future visits for inclusion in their record    - 75 better food list reviewed & given to patient along with the omega 6 food list to avoid      - Gluten in corn and oats abstracts sheet reviewed and given to the patient today    - 23 Foods containing Latex-like proteins was reviewed and copy to be taken if desired     - Nutrient Supplements list provided and copyto be taken if desired    - Mxlhsrbartgdts767hyil. [employer] web site offered to patient to review at their convenience by staff with login information    Note:  I have discussed with the patient that with all nutraceuticals, there is often mixed data and emerging research which needs to be monitored; as well as an array of NIH fact sheets on nutrients and supplements, available at www.nih,issue plus [employer]. com plus www.lpi,org. If I have recommended cinnamon at the request of this patient to assist them in control of their blood sugar, triglyceride, and/or weight issues. I discussed that the patient's clinical use of cinnamon bark, calcium, magnesium, Vitamin D, and pharmaceutical grade CVS omega 3 oil or triple-strength fish oil, and B-50/B-100 time-released B-complex by [address] [facility] will be for a time-limited trial to determine their individual effectiveness and safety in this patient.   I also referred the patient to the NMCD: Nutrition, Metabolism, and Cardiovascular Diseases (SecuritiesCard.pl) and concerns about long-term use and hepatotoxicity of cinnamon and other nutrients. I suggested they frequently search nih.gov for the latest non-proprietary information on nutriceuticals as well as consider a subscription to [employer] for details on reviewed supplements, or at the least review the nutrient files at [facility], [address] bark, an insulin mimetic, reduces some High Carbohydrate Dietary Impacts. Methylhydroxychalcone polymers insulin-enhancing properties in fat cells are responsible for enhanced glucose uptake, inhibiting hepatic HMG-CoA reductase and lowers lipids. www.jacn. org/content/20/4/327.full     But cinnamon with additives such as Cinnamon Extract are not effective as insulin mimetics.  :eStoreDirectory.at     Nutrients for Start up from [employer] or [employer] for ease to get started now;  Robbie Lerma has some useable products;  - Triple Strength Fish Oil, enteric coated  - Vit D-3 5000 IU gel caps  - Iron ferrous sulfate 325 mg tabs  - Centrum Silver look-a-like for most patients, or  - Centrum plain look-a-like if need iron    Local pharmacies or chains such as [employer], [employer], have:  - [employer] pharmaceutical grade omega 3 is 90% EPA/DHA whereas most Triple strength fish oil are 75% EPA/DHA  - Triple Strength Fish Oil (enteric coated if available) or if not enteric coated, can take from freezer for less burps  - B-50 or B-100 released balanced B complex tabs by [address] [facility] bark 500 mg (without Chromium or extracts)   some brands list 1000 mg / serving of 2 capsules,    some brands have 1000 mg caps with the undesireable chromium extract  - Calcium carbonate/citrate, magnesium oxide/citrate, Vit D-3 as 3-4 tabs/caps/serving     Some Local Brands may contain Zinc which is acceptable for the first bottle or two  - Magnesium oxide 250 mg tabs for those having < 2 bowel movements daily  - Magnesium citrate 200 mg if having > 2 bowel movements/day  - Centrum Silver or look-a-like for most patients, Centrum plain or look-a-like with iron  - Vitamin D-3 comes as 1,000 IU or 2,000 IU or 5,000 IU gel caps or Liquid drops but keep Vitamin D levels <50 but >40     Some brands containing or derived from soy oil or corn oil are OK if not allergic to soy  - Elemental Iron 65 mg tabs at bedtime is available over the counter if need more iron     Usually turns bowel movements grey, green, or black but not a concern  - Apricot Kernel Oil (by Now) for dry skin sensitive perineal or perianal area skin    Nutrients for ongoing use by Mail order for less expense from [employer] ;  - Strength Fish Oil , 240 Softgels Item #859654  -B-100 time released balanced B complex Item #[id number]  - Cinnamon bark 500 mg without Chromium or extract Item #012250  - Calcium carbonate 1000 mg, Magnesium oxide 500 mg, Vit D-3 400 IU Item #[id number]  - Magnesium oxide 500 mg tabs Item #913918 if less than 2 bowel movements daily  - [employer] Item #345947 for most patients, One Daily Item #793116 with iron  - Vit D 3  1,000 Item #123816      2,000 IU Item #[id number]   Item #805729     Some brands containing orderived from soy oil or corn oil are OK if not allergic to soy    Nutrients for Special Needs by Mail order for less expense from www. puritan.com;  -Elemental Iron 65 mg tabs Item #540408 if need more iron for low iron on labs    Usually turns bowel movements grey, green or black but not a concern  - Time released Niacin 250 mg Item #946804 for cold intolerance, low libido or impotence  - DHEA 50 mg Item #785840 for improving DHEA levels on labs if having Fatigue    If stools too loose substitute for your Magnesium oxide using;   Magnesium citrate 200 mg tabs (NOT liquid) at [employer]   Magnesium gluconate 550 mg by Keary Gosselin at [employer] or [employer]  Magnesium chloride foot soaks or body sprays  www.[employer]   Magnesium chloride flakes 14.99 Item #: [id number] if back-ordered, get spray  Magnesium threonate, Magtein also helps mental clarity and sleep    Food Drink Symptom Log;  I asked this patient to track these items and any other symptoms on their list on a weekly basis to documenttheir progress or lack of same. This can be done on the symptom tracking sheet I gave them at today's visit but looks like this:                                                      Rate on scale of 0-10 with zero = not noticeable  Symptom:                            Week 1               2                 3                 4               Etc            Hair loss    Foot cramps    Paresthesia    Aches    IBS (irritable bowel)    Constipation    Diarrhea  Nocturia (up to bathroom at night)    Fatigue/Energy level  Stress      On the other side of the sheet they can track their food, drink, environment, activity, symptoms etc      Avoiding Latex-like proteins in my foods; Avocados, Bananas, Celery, Figs & Kiwi proteins have latex-like proteins to inflame our immune systems, plus 47 more foods  How Can I Have A Latex Allergy? Eating foods with latex-like protein exposes us to latex allergies. Our body cannot tell the differencebetween these latex-like proteins and latex from rubber products since many people are allergic to fruit, vegetables and latex. Read labels on pre-packaged foods. This list to avoid is only a guide if you are known allergicto latex or have a latex rash on your chin, cheeks and lines on your neck and chest. The amount of latex is different in each food product or fruit variety. Avoid out of Season if not grown locally:   Melon, Nectarine, Papaya, Cherry, Passion fruit, Plum, Chestnuts, and Tomato. Avocado, Banana, Celery, Figs, and Kiwi always contain Latex-like protein. Whats in Season?   Strawberries taste better in June than December because June is strawberry season so buy locally grown produce \"in season\" for the best flavor, cost, and less Latex. Locally grown produce not only tastes great but also requires little or no ethylene exposure in food distribution so has less latex content. Out of season: use canned, frozen, or dried since those are processed ripe and latex content is lower!!!     Month     [place] Locally Grown Produce  January, February, March: use canned, frozen or dried fruits since lower in latex  April: asparagus, radishes  May: asparagus, broccoli, green onions, greens, peas, radishes, rhubarb  Jennie: asparagus, beets, beans, broccoli, cabbage, cantaloupe, carrots, green onions, greens, lettuce, onions, parsley, peas, radishes, rhubarb, strawberries, watermelons  July: beans, beets, blueberries, broccoli, cabbage, cantaloupe, carrots, cauliflower, celery, cucumbers, eggplant, grapes, green onions, greens, lettuce, onions, parsley, peas, peaches, bell peppers, potatoes, radishes, summer raspberries, squash, sweetcorn, tomatoes, turnips, watermelons  August: apples, beans, beets, blueberries, cabbage, cantaloupe, carrots, cauliflower, celery, cucumbers, eggplant, grapes, green onions, greens, lettuce, onions, parsley, peas, peaches, pears, bell peppers, potatoes, radishes, squash, sweet corn, tomatoes, turnips, watermelons  September: apples, beans, beets, blueberries, cabbage, cantaloupe, carrots, cauliflower, celery, cucumbers, eggplant, grapes, green onions, greens, lettuce, onions, parsley, peas, peaches, pears, bell peppers, plums, potatoes, pumpkins, radishes, fall red raspberries, squash, sweet corn, tomatoes, turnips, watermelons  October: apples, beets, broccoli, cabbage, carrots, cauliflower, celery, green onions, greens, lettuce, parsley, peas, pears, potatoes, pumpkins, radishes, fall red raspberries, squash, turnips  November: broccoli, cabbage, carrots, parsley, pears, peas  December: use canned, frozen or dried fruits since lower in latex    Upto half of latex-sensitive patients show allergic reactions to fruits (avocados, bananas, kiwifruits, papayas, peaches),   Annals of Allergy, 1994. These plants contain the same proteins that are allergens in latex. People with fruit allergies should warn physicians before undergoing procedures which may cause anaphylactic reaction if in contact with latex gloves. Some of the common foods with defined cross-reactivity to latex are avocado, banana, kiwi, chestnut, raw potato, tomato, stone fruits (e.g., peach, cherry), hazelnut, melons, celery, carrot, apple, pear, papaya, and almond. Foods with less well-defined cross-reactivity to latex are peanuts, peppers, citrus fruits, coconut, pineapple, thom, fig, passion fruit, Ugli fruit, and grape. This fruit/latex cross-reactivity is worsened by ethylene, a gas used to hasten commercial ripening. In nature, plants produce low levels of the hormone ethylene, which regulates germination, flowering, and ripening. Forced ripening by high ethylene concentrations, plants produce allergenic wound-repair proteins, which are similar to wound-repair proteins made during the tapping of rubber trees. Sensitive individuals who ingest the fruit get a higher dose and worse reaction. Some people may even first become sensitized to latex through fruit. Can food processing increase the concentrations of allergenic proteins? Latex-sensitized children (and adults) in [place] often experience allergic reactions after eating bananas ripened artificially with ethylene. In the [place], food distribution centers treat unripe bananas and other produce with ethylene to ripen; not commonly done in [facility] since fruit is tree-ripened there. Does treatment of food with ethylene induce banana proteins that cross-react with latex?  (Nic et al.)    References:   Latex in Foods Allergy, http://ehp.niehs.nih.gov/members/2003/5811/5811.html    Search web for \" Whats in Season \" for where you live or are at the time you food shop   Management of Latex, ://medicalcenter. o[employer].edu/  search for nih, latex-like proteins in foods

## 2023-01-03 NOTE — PATIENT INSTRUCTIONS
Thank you   Thank you for trusting us with your healthcare needs. You may receive a survey regarding today's visit. It would help us out if you would take a few moments to provide your feedback. We value your input. Please bring in ALL medications BOTTLES, including inhalers, herbal supplements, over the counter, prescribed & non-prescribed medicine. The office would like actual medication bottles and a list.   Please note our OFFICE POLICIES:   Prior to getting your labs drawn, please check with your insurance company for benefits and eligibility of lab services. Often, insurance companies cover certain tests for preventative visits only. It is patient's responsibility to see what is covered. We are unable to change a diagnosis after the test has been performed. Lab orders will not be re-printed. Please hold onto your original lab orders and take them to your lab to be completed. If you no show your scheduled appointment three times, you will be dismissed from this practice. Reschedules must be completed 24 hours prior to your schedule appointment. If the list below has been completed, PLEASE FAX RECORDS TO OUR OFFICE @ [phone].  Once the records have been received we will update your records at our office:  Health Maintenance Due   Topic Date Due    Pneumococcal 65+ years Vaccine (1 - PCV) Never done    DTaP/Tdap/Td vaccine (1 - Tdap) Never done    Shingles vaccine (1 of 2) Never done    Annual Wellness Visit (AWV)  Never done

## 2023-01-04 LAB
ANISOCYTOSIS: PRESENT
BASOPHILS # BLD: 0.2 %
BASOPHILS ABSOLUTE: 0 THOU/MM3 (ref 0–0.1)
C-REACTIVE PROTEIN, HIGH SENSITIVITY: 39.7 MG/L
EOSINOPHIL # BLD: 1.3 %
EOSINOPHILS ABSOLUTE: 0.1 THOU/MM3 (ref 0–0.4)
ERYTHROCYTE [DISTWIDTH] IN BLOOD BY AUTOMATED COUNT: 22.9 % (ref 11.5–14.5)
ERYTHROCYTE [DISTWIDTH] IN BLOOD BY AUTOMATED COUNT: 47.2 FL (ref 35–45)
HCT VFR BLD CALC: 20.5 % (ref 42–52)
HEMOGLOBIN: 5.2 GM/DL (ref 14–18)
HOMOCYSTEINE: 6.5 UMOL/L
HYPOCHROMIA: PRESENT
IMMATURE GRANS (ABS): 0.05 THOU/MM3 (ref 0–0.07)
IMMATURE GRANULOCYTES: 0.5 %
LYMPHOCYTES # BLD: 14.3 %
LYMPHOCYTES ABSOLUTE: 1.4 THOU/MM3 (ref 1–4.8)
MCH RBC QN AUTO: 15.5 PG (ref 26–33)
MCHC RBC AUTO-ENTMCNC: 25.4 GM/DL (ref 32.2–35.5)
MCV RBC AUTO: 61 FL (ref 80–94)
MICROCYTES: PRESENT
MONOCYTES # BLD: 13.9 %
MONOCYTES ABSOLUTE: 1.3 THOU/MM3 (ref 0.4–1.3)
NUCLEATED RED BLOOD CELLS: 0 /100 WBC
PATHOLOGIST REVIEW: ABNORMAL
PLATELET # BLD: 338 THOU/MM3 (ref 130–400)
PMV BLD AUTO: ABNORMAL FL (ref 9.4–12.4)
RBC # BLD: 3.36 MILL/MM3 (ref 4.7–6.1)
SEG NEUTROPHILS: 69.8 %
SEGMENTED NEUTROPHILS ABSOLUTE COUNT: 6.6 THOU/MM3 (ref 1.8–7.7)
T3 TOTAL: 119 NG/DL
WBC # BLD: 9.5 THOU/MM3 (ref 4.8–10.8)

## 2023-01-05 LAB
DHEAS (DHEA SULFATE): 57.1 UG/DL (ref 28–175)
THYROID PEROXIDASE ANTIBODY: 8.2 IU/ML (ref 0–25)
TISSUE TRANSGLUTAMINASE IGA: 0.5 U/ML
TTG, IGG: < 0.6 U/ML

## 2023-01-06 LAB
ANA SCREEN: DETECTED
IGA: 160 MG/DL (ref 70–400)
IGG: 1380 MG/DL (ref 700–1600)
IGM: 113 MG/DL (ref 40–230)
LIPOPROTEIN (A): NORMAL
MISC. #1 REFERENCE GROUP TEST: NORMAL
TESTOSTERONE FREE: NORMAL
THYROXINE (T4): 8.1 UG/DL
VITAMIN B6: 107 NMOL/L (ref 20–125)

## 2023-01-07 LAB
GLIADIN PEPTIDE IGG: 0.7 U/ML
RETINOL (VITAMIN A): NORMAL
VITAMIN E LEVEL: NORMAL

## 2023-01-08 LAB
ANA PATTERN: ABNORMAL
ANA TITER: ABNORMAL
ANTINUCLEAR ANTIBODY, HEP-2, IGG: DETECTED

## 2023-03-27 NOTE — TELEPHONE ENCOUNTER
Last visit- 1/3/2023  Next visit- Visit date not found    Requested Prescriptions     Pending Prescriptions Disp Refills    hydroCHLOROthiazide (HYDRODIURIL) 25 MG tablet 180 tablet 3     Sig: Take 1 tablet by mouth daily At breakfast and dinner if leg or foot swollen    lovastatin (MEVACOR) 10 MG tablet 30 tablet 3     Sig: Take 1 tablet by mouth nightly     Please review, approve or deny

## 2023-03-28 RX ORDER — HYDROCHLOROTHIAZIDE 25 MG/1
25 TABLET ORAL DAILY
Qty: 180 TABLET | Refills: 3 | Status: SHIPPED | OUTPATIENT
Start: 2023-03-28

## 2023-03-28 RX ORDER — LOVASTATIN 10 MG/1
10 TABLET ORAL NIGHTLY
Qty: 30 TABLET | Refills: 3 | Status: SHIPPED | OUTPATIENT
Start: 2023-03-28

## 2023-04-27 ENCOUNTER — TELEPHONE (OUTPATIENT)
Dept: FAMILY MEDICINE CLINIC | Age: 73
End: 2023-04-27

## 2023-04-27 DIAGNOSIS — R10.84 GENERALIZED ABDOMINAL PAIN: Primary | ICD-10-CM

## 2023-04-27 NOTE — TELEPHONE ENCOUNTER
Pt called. [facility] he has abdominal pain and it travels to the right side. Wants to know if he can get lab testing to see if it could be his gallbladder or appendix? Would like them mailed to him if possible. He does vomit every morning upon waking. Will not go to Emergency room and will not see his pcp, wants only Dr. Darci Goel. If he cannot order without seeing him for exam, he will come in on Tuesday @ 1;30. Wants a call back either way.

## 2023-04-28 NOTE — TELEPHONE ENCOUNTER
April 28, 2023  Steffany Pimentel MD  to Me         6:52 AM  Please order an abdominal ultrasound for abd pan radiation  to the Right      Placed order, please make sure diagnosis is correct.   Not sure if upper abdominal or lower

## 2023-05-04 ENCOUNTER — TELEPHONE (OUTPATIENT)
Dept: FAMILY MEDICINE CLINIC | Age: 73
End: 2023-05-04

## 2023-05-04 ENCOUNTER — NURSE ONLY (OUTPATIENT)
Dept: LAB | Age: 73
End: 2023-05-04

## 2023-05-04 ENCOUNTER — OFFICE VISIT (OUTPATIENT)
Dept: FAMILY MEDICINE CLINIC | Age: 73
End: 2023-05-04
Payer: MEDICARE

## 2023-05-04 VITALS
BODY MASS INDEX: 21.82 KG/M2 | RESPIRATION RATE: 10 BRPM | WEIGHT: 144 LBS | SYSTOLIC BLOOD PRESSURE: 146 MMHG | TEMPERATURE: 98 F | HEART RATE: 78 BPM | HEIGHT: 68 IN | OXYGEN SATURATION: 99 % | DIASTOLIC BLOOD PRESSURE: 68 MMHG

## 2023-05-04 DIAGNOSIS — M62.81 MUSCLE WEAKNESS (GENERALIZED): ICD-10-CM

## 2023-05-04 DIAGNOSIS — E78.5 ELEVATED LIPIDS: ICD-10-CM

## 2023-05-04 DIAGNOSIS — R10.11 ABDOMINAL PAIN, RIGHT UPPER QUADRANT: ICD-10-CM

## 2023-05-04 DIAGNOSIS — R35.1 NOCTURIA: ICD-10-CM

## 2023-05-04 DIAGNOSIS — E55.9 VITAMIN D DEFICIENCY: ICD-10-CM

## 2023-05-04 DIAGNOSIS — R79.9 ABNORMAL FINDING OF BLOOD CHEMISTRY: ICD-10-CM

## 2023-05-04 DIAGNOSIS — R58 HEMORRHAGE: ICD-10-CM

## 2023-05-04 DIAGNOSIS — R10.84 GENERALIZED ABDOMINAL PAIN: ICD-10-CM

## 2023-05-04 DIAGNOSIS — R73.09 LOW GLUCOSE LEVEL: ICD-10-CM

## 2023-05-04 DIAGNOSIS — R79.83 HOMOCYSTEINEMIA: ICD-10-CM

## 2023-05-04 DIAGNOSIS — E78.1 PURE HYPERGLYCERIDEMIA: ICD-10-CM

## 2023-05-04 DIAGNOSIS — K90.89 OTHER INTESTINAL MALABSORPTION: ICD-10-CM

## 2023-05-04 DIAGNOSIS — I10 ESSENTIAL HYPERTENSION: ICD-10-CM

## 2023-05-04 DIAGNOSIS — R10.84 GENERALIZED ABDOMINAL PAIN: Primary | ICD-10-CM

## 2023-05-04 PROBLEM — D62 ACUTE BLOOD LOSS ANEMIA: Status: ACTIVE | Noted: 2023-01-05

## 2023-05-04 LAB
25(OH)D3 SERPL-MCNC: 23 NG/ML (ref 30–100)
ALBUMIN SERPL BCG-MCNC: 4.1 G/DL (ref 3.5–5.1)
ALP SERPL-CCNC: 68 U/L (ref 38–126)
ALT SERPL W/O P-5'-P-CCNC: 9 U/L (ref 11–66)
AMYLASE SERPL-CCNC: 39 U/L (ref 20–104)
ANION GAP SERPL CALC-SCNC: 14 MEQ/L (ref 8–16)
ANISOCYTOSIS BLD QL SMEAR: PRESENT
AST SERPL-CCNC: 14 U/L (ref 5–40)
BASO STIPL BLD QL SMEAR: ABNORMAL
BASOPHILS ABSOLUTE: 0 THOU/MM3 (ref 0–0.1)
BASOPHILS NFR BLD AUTO: 0.5 %
BILIRUB CONJ SERPL-MCNC: < 0.2 MG/DL (ref 0–0.3)
BILIRUB SERPL-MCNC: 0.5 MG/DL (ref 0.3–1.2)
BUN SERPL-MCNC: 14 MG/DL (ref 7–22)
CALCIUM SERPL-MCNC: 9.6 MG/DL (ref 8.5–10.5)
CHLORIDE SERPL-SCNC: 100 MEQ/L (ref 98–111)
CHOLEST SERPL-MCNC: 110 MG/DL (ref 100–199)
CO2 SERPL-SCNC: 25 MEQ/L (ref 23–33)
CREAT SERPL-MCNC: 0.5 MG/DL (ref 0.4–1.2)
DEPRECATED MEAN GLUCOSE BLD GHB EST-ACNC: 87 MG/DL (ref 70–126)
DEPRECATED RDW RBC AUTO: 51.1 FL (ref 35–45)
ELLIPTOCYTES: ABNORMAL
EOSINOPHIL NFR BLD AUTO: 1.7 %
EOSINOPHILS ABSOLUTE: 0.2 THOU/MM3 (ref 0–0.4)
ERYTHROCYTE [DISTWIDTH] IN BLOOD BY AUTOMATED COUNT: 22.3 % (ref 11.5–14.5)
ERYTHROCYTE [SEDIMENTATION RATE] IN BLOOD BY WESTERGREN METHOD: 32 MM/HR (ref 0–10)
GFR SERPL CREATININE-BSD FRML MDRD: > 60 ML/MIN/1.73M2
GLUCOSE SERPL-MCNC: 91 MG/DL (ref 70–108)
HBA1C MFR BLD HPLC: 4.9 % (ref 4.4–6.4)
HCT VFR BLD AUTO: 23.7 % (ref 42–52)
HDLC SERPL-MCNC: 46 MG/DL
HGB BLD-MCNC: 5.6 GM/DL (ref 14–18)
HOMOCYSTEINE: 6.2 UMOL/L
HYPOCHROMIA BLD QL SMEAR: PRESENT
IMM GRANULOCYTES # BLD AUTO: 0.05 THOU/MM3 (ref 0–0.07)
IMM GRANULOCYTES NFR BLD AUTO: 0.5 %
IRON SERPL-MCNC: 10 UG/DL (ref 65–195)
LDLC SERPL CALC-MCNC: 53 MG/DL
LIPASE SERPL-CCNC: 18.3 U/L (ref 5.6–51.3)
LYMPHOCYTES ABSOLUTE: 1 THOU/MM3 (ref 1–4.8)
LYMPHOCYTES NFR BLD AUTO: 10.9 %
MAGNESIUM SERPL-MCNC: 2.3 MG/DL (ref 1.6–2.4)
MCH RBC QN AUTO: 15.7 PG (ref 26–33)
MCHC RBC AUTO-ENTMCNC: 23.6 GM/DL (ref 32.2–35.5)
MCV RBC AUTO: 66.4 FL (ref 80–94)
MICROCYTES BLD QL SMEAR: PRESENT
MONOCYTES ABSOLUTE: 0.8 THOU/MM3 (ref 0.4–1.3)
MONOCYTES NFR BLD AUTO: 8.8 %
NEUTROPHILS NFR BLD AUTO: 77.6 %
NRBC BLD AUTO-RTO: 0 /100 WBC
PATHOLOGIST REVIEW: ABNORMAL
PLATELET # BLD AUTO: 338 THOU/MM3 (ref 130–400)
PMV BLD AUTO: ABNORMAL FL (ref 9.4–12.4)
POIKILOCYTES: ABNORMAL
POTASSIUM SERPL-SCNC: 3.5 MEQ/L (ref 3.5–5.2)
PROT SERPL-MCNC: 7.1 G/DL (ref 6.1–8)
PSA SERPL-MCNC: 5.71 NG/ML (ref 0–1)
PTH-INTACT SERPL-MCNC: 39.4 PG/ML (ref 15–65)
RBC # BLD AUTO: 3.57 MILL/MM3 (ref 4.7–6.1)
RHEUMATOID FACT SERPL-ACNC: < 10 IU/ML (ref 0–13)
SCAN OF BLOOD SMEAR: NORMAL
SEGMENTED NEUTROPHILS ABSOLUTE COUNT: 7.2 THOU/MM3 (ref 1.8–7.7)
SODIUM SERPL-SCNC: 139 MEQ/L (ref 135–145)
T4 FREE SERPL-MCNC: 1.19 NG/DL (ref 0.93–1.76)
TIBC SERPL-MCNC: 323 UG/DL (ref 171–450)
TRIGL SERPL-MCNC: 56 MG/DL (ref 0–199)
TSH SERPL DL<=0.005 MIU/L-ACNC: 1.17 UIU/ML (ref 0.4–4.2)
URATE SERPL-MCNC: 5.7 MG/DL (ref 3.7–7)
WBC # BLD AUTO: 9.3 THOU/MM3 (ref 4.8–10.8)

## 2023-05-04 PROCEDURE — 3017F COLORECTAL CA SCREEN DOC REV: CPT | Performed by: FAMILY MEDICINE

## 2023-05-04 PROCEDURE — G8427 DOCREV CUR MEDS BY ELIG CLIN: HCPCS | Performed by: FAMILY MEDICINE

## 2023-05-04 PROCEDURE — 1036F TOBACCO NON-USER: CPT | Performed by: FAMILY MEDICINE

## 2023-05-04 PROCEDURE — G8420 CALC BMI NORM PARAMETERS: HCPCS | Performed by: FAMILY MEDICINE

## 2023-05-04 PROCEDURE — 1123F ACP DISCUSS/DSCN MKR DOCD: CPT | Performed by: FAMILY MEDICINE

## 2023-05-04 PROCEDURE — 3078F DIAST BP <80 MM HG: CPT | Performed by: FAMILY MEDICINE

## 2023-05-04 PROCEDURE — 3077F SYST BP >= 140 MM HG: CPT | Performed by: FAMILY MEDICINE

## 2023-05-04 PROCEDURE — 99215 OFFICE O/P EST HI 40 MIN: CPT | Performed by: FAMILY MEDICINE

## 2023-05-04 SDOH — ECONOMIC STABILITY: INCOME INSECURITY: HOW HARD IS IT FOR YOU TO PAY FOR THE VERY BASICS LIKE FOOD, HOUSING, MEDICAL CARE, AND HEATING?: NOT VERY HARD

## 2023-05-04 SDOH — ECONOMIC STABILITY: HOUSING INSECURITY
IN THE LAST 12 MONTHS, WAS THERE A TIME WHEN YOU DID NOT HAVE A STEADY PLACE TO SLEEP OR SLEPT IN A SHELTER (INCLUDING NOW)?: NO

## 2023-05-04 SDOH — ECONOMIC STABILITY: FOOD INSECURITY: WITHIN THE PAST 12 MONTHS, THE FOOD YOU BOUGHT JUST DIDN'T LAST AND YOU DIDN'T HAVE MONEY TO GET MORE.: NEVER TRUE

## 2023-05-04 SDOH — ECONOMIC STABILITY: FOOD INSECURITY: WITHIN THE PAST 12 MONTHS, YOU WORRIED THAT YOUR FOOD WOULD RUN OUT BEFORE YOU GOT MONEY TO BUY MORE.: NEVER TRUE

## 2023-05-04 NOTE — PROGRESS NOTES
properties in fat cells are responsible for enhanced glucose uptake, inhibiting hepatic HMG-CoA reductase and lowers lipids. www.jacn. org/content/20/4/327.full     But cinnamon with additives such as Cinnamon Extract are not effective as insulin mimetics.  :eStoreDirectory.at     Nutrients for Start up from [employer]. or [employer] for ease to get started now;  Robbie Lerma has some useable products;  - Triple Strength Fish Oil, enteric coated  - Vit D-3 5000 IU gel caps  - Iron ferrous sulfate 325 mg tabs  - Centrum Silver look-a-like for most patients, or  - Centrum plain look-a-like if need iron    Local pharmacies or chains such as "[employer], have:  - [employer] pharmaceutical grade omega 3 is 90% EPA/DHA whereas most Triple strength fish oil are 75% EPA/DHA  - Triple Strength Fish Oil (enteric coated if available) or if not enteric coated, can take from freezer for less burps  - B-50 or B-100 released balanced B complex tabs by [address] [facility] at [facility] bark 500 mg (without Chromium or extracts)   some brands list 1000 mg / serving of 2 capsules,    some brands have 1000 mg caps with the undesireable chromium extract  - Calcium carbonate/citrate, magnesium oxide/citrate, Vit D-3 as 3-4 tabs/caps/serving     Some Local Brands may contain Zinc which is acceptable for the first bottle or two  - Magnesium oxide 250 mg tabs for those having < 2 bowel movements daily  - Magnesium citrate 200 mg if having > 2 bowel movements/day  - Centrum Silver or look-a-like for most patients, Centrum plain or look-a-like with iron  - Vitamin D-3 comes as 1,000 IU or 2,000 IU or 5,000 IU gel caps or Liquid drops but keep Vitamin D levels <50 but >40     Some brands containing or derived from soy oil or corn oil are OK if not allergic to soy  - Elemental Iron 65 mg tabs at bedtime is available over the counter if need more iron     Usually turns bowel movements grey, green, or

## 2023-05-05 LAB
C-REACTIVE PROTEIN, HIGH SENSITIVITY: 60.9 MG/L
DHEA-S SERPL-MCNC: 42.7 UG/DL (ref 28–175)
T3 TOTAL: 100 NG/DL (ref 60–181)
T3FREE SERPL-MCNC: 2.8 PG/ML (ref 2.02–4.43)
T4 SERPL-MCNC: 8.5 UG/DL (ref 4.5–10.9)
THYROPEROXIDASE AB SERPL IA-ACNC: 5.7 IU/ML (ref 0–25)

## 2023-05-06 LAB
GLIADIN IGG SER IA-ACNC: 0.6 U/ML
NUCLEAR IGG SER QL IA: DETECTED
TESTOSTERONE FREE: NORMAL
TTG IGA SER IA-ACNC: 0.3 U/ML
TTG IGG SER IA-ACNC: < 0.6 U/ML

## 2023-05-07 LAB — DHEA SERPL-MCNC: 2.05 NG/ML (ref 0.63–4.7)

## 2023-05-08 LAB
ANA PAT SER IF-IMP: ABNORMAL
NUCLEAR IGG SER QL IF: DETECTED
NUCLEAR IGG TITR SER IF: ABNORMAL {TITER}

## 2023-07-13 DIAGNOSIS — J01.00 SUBACUTE MAXILLARY SINUSITIS: ICD-10-CM

## 2023-07-13 RX ORDER — PENICILLIN V POTASSIUM 500 MG/1
TABLET ORAL
Qty: 100 TABLET | Refills: 0 | Status: SHIPPED | OUTPATIENT
Start: 2023-07-13

## 2023-07-13 NOTE — TELEPHONE ENCOUNTER
Last visit- 5/4/2023  Next visit- 9/5/2023    Requested Prescriptions     Pending Prescriptions Disp Refills    penicillin v potassium (VEETID) 500 MG tablet [Pharmacy Med Name: Penicillin V Potassium 500 MG Oral Tablet] 100 tablet 0     Sig: TAKE 1 TABLET BY MOUTH THREE TIMES DAILY     Please approve or deny

## 2023-08-20 NOTE — PATIENT INSTRUCTIONS
Thank you   Thank you for trusting us with your healthcare needs. You may receive a survey regarding today's visit. It would help us out if you would take a few moments to provide your feedback. We value your input. Please bring in ALL medications BOTTLES, including inhalers, herbal supplements, over the counter, prescribed & non-prescribed medicine. The office would like actual medication bottles and a list.   Please note our OFFICE POLICIES:   Prior to getting your labs drawn, please check with your insurance company for benefits and eligibility of lab services. Often, insurance companies cover certain tests for preventative visits only. It is patient's responsibility to see what is covered. We are unable to change a diagnosis after the test has been performed. Lab orders will not be re-printed. Please hold onto your original lab orders and take them to your lab to be completed. If you no show your scheduled appointment three times, you will be dismissed from this practice. Reschedules must be completed 24 hours prior to your schedule appointment. If the list below has been completed, PLEASE FAX RECORDS TO OUR OFFICE @ [phone].  Once the records have been received we will update your records at our office:  Health Maintenance Due   Topic Date Due    Pneumococcal 65+ years Vaccine (1 - PCV) Never done    DTaP/Tdap/Td vaccine (1 - Tdap) Never done    Shingles vaccine (1 of 2) Never done    Annual Wellness Visit (AWV)  Never done    Depression Screen  06/17/2022
36.8

## 2023-12-15 ENCOUNTER — TELEPHONE (OUTPATIENT)
Dept: FAMILY MEDICINE CLINIC | Age: 73
End: 2023-12-15

## 2023-12-15 NOTE — TELEPHONE ENCOUNTER
Pt left message for call back . I called him back today and got voice mail. Lm asking that he call Monday after 8 am or send a my chart message. Also that we have no more in person appts available this year, but we do have virtual.  Dr Paulie Pavon will be leaving 12/22/2023 & return 3/18/2023.

## 2023-12-27 ENCOUNTER — PATIENT MESSAGE (OUTPATIENT)
Dept: FAMILY MEDICINE CLINIC | Age: 73
End: 2023-12-27

## 2023-12-27 DIAGNOSIS — N52.01 ERECTILE DYSFUNCTION DUE TO ARTERIAL INSUFFICIENCY: ICD-10-CM

## 2023-12-27 RX ORDER — CHLORAL HYDRATE 500 MG
1000 CAPSULE ORAL 3 TIMES DAILY
COMMUNITY

## 2023-12-27 RX ORDER — MULTIVIT WITH MINERALS/LUTEIN
1 TABLET ORAL 2 TIMES DAILY WITH MEALS
COMMUNITY

## 2023-12-27 RX ORDER — SILDENAFIL 100 MG/1
TABLET, FILM COATED ORAL
Qty: 10 TABLET | Refills: 5 | Status: SHIPPED | OUTPATIENT
Start: 2023-12-27

## 2023-12-27 NOTE — TELEPHONE ENCOUNTER
From: Riri Linares  To: Dr. Mal Glaser  Sent: 12/27/2023 12:33 AM EST  Subject: refil    eleazar Chi could you send me refill for sindafel ( viagra 100 mg) thank you

## 2024-06-03 ENCOUNTER — TELEPHONE (OUTPATIENT)
Dept: FAMILY MEDICINE CLINIC | Age: 74
End: 2024-06-03

## 2024-06-03 DIAGNOSIS — R10.84 GENERALIZED ABDOMINAL PAIN: ICD-10-CM

## 2024-06-03 DIAGNOSIS — R35.1 NOCTURIA: ICD-10-CM

## 2024-06-03 DIAGNOSIS — N52.01 ERECTILE DYSFUNCTION DUE TO ARTERIAL INSUFFICIENCY: Primary | ICD-10-CM

## 2024-06-03 DIAGNOSIS — E55.9 VITAMIN D DEFICIENCY: ICD-10-CM

## 2024-06-03 NOTE — TELEPHONE ENCOUNTER
PT called, lm asking for a cb.  I called and asked how I could help?  He said his job requires a TB blood test and a urine drug testing a 10 panel.  Also he needs a flu vaccine.    I told him he should be going to his PCP for this.  He said he calls but no ones responds.  Suggested he gets a flu vaccine from a local drug store like [facility].  He voiced understanding.    As for the 10 panel uring/drug test and TB test, he said he wants it faxed to [facility] @ [phone].    Orders pended, please review, approve or deny

## 2024-09-30 RX ORDER — LOVASTATIN 10 MG
10 TABLET ORAL NIGHTLY
Qty: 30 TABLET | Refills: 3 | Status: SHIPPED | OUTPATIENT
Start: 2024-09-30

## 2024-09-30 NOTE — TELEPHONE ENCOUNTER
Pt called and lm asking for a refill of the Lovastatin or Mevacor 10 mg.  He wants it sent to [facility].    Last visit- 5/4/2023  Next visit- Visit date not found    Requested Prescriptions     Pending Prescriptions Disp Refills    lovastatin (MEVACOR) 10 MG tablet 30 tablet 3     Sig: Take 1 tablet by mouth nightly     Please review, approve or deny.  Did just update Care Everywhere to review if needed.